# Patient Record
Sex: MALE | Race: BLACK OR AFRICAN AMERICAN | NOT HISPANIC OR LATINO | Employment: FULL TIME | URBAN - METROPOLITAN AREA
[De-identification: names, ages, dates, MRNs, and addresses within clinical notes are randomized per-mention and may not be internally consistent; named-entity substitution may affect disease eponyms.]

---

## 2017-05-09 RX ORDER — AMLODIPINE BESYLATE 5 MG/1
TABLET ORAL
Qty: 90 TABLET | Refills: 0 | OUTPATIENT
Start: 2017-05-09

## 2017-06-03 DIAGNOSIS — E78.5 HYPERLIPIDEMIA: ICD-10-CM

## 2017-06-09 ENCOUNTER — PATIENT MESSAGE (OUTPATIENT)
Dept: INTERNAL MEDICINE | Facility: CLINIC | Age: 52
End: 2017-06-09

## 2017-06-09 ENCOUNTER — TELEPHONE (OUTPATIENT)
Dept: INTERNAL MEDICINE | Facility: CLINIC | Age: 52
End: 2017-06-09

## 2017-06-09 NOTE — TELEPHONE ENCOUNTER
----- Message from Kenan Reyes MD sent at 6/9/2017  1:38 PM CDT -----  Contact: patient- [phone]  I'm not his physician and I have not established a relationship with this pt and will not fill this Rx. Thx  ----- Message -----  From: Martha Martinez MA  Sent: 6/9/2017   1:23 PM  To: Kenan Reyes MD    Should the pt follow up with current PCP? Or can you fill it? (he is scheduled to come in for a NP appt with you 6/19  ----- Message -----  From: Romy Pickard  Sent: 6/9/2017   1:11 PM  To: Eric Grayson Staff    RX request - refill or new RX.  Is this a refill or new RX:  New Rx  RX name and strength: simvastatin (ZOCOR) 20 MG tablet   Directions:   Is this a 30 day or 90 day RX:  30   Pharmacy name and phone #: [facility] [phone] (Phone)  [phone] (Fax)  Comments:  Set an appointment but is out of medication now form Dr. Millard was your PCP

## 2017-06-09 NOTE — TELEPHONE ENCOUNTER
Responded to pt request for refill on medication via patient portal. Advised pt to contact his PCP as he has not yet established with Dr Reyes.

## 2017-06-12 NOTE — TELEPHONE ENCOUNTER
----- Message from Keesha Carlson sent at 6/12/2017  1:37 PM CDT -----  Contact: Self/[phone]  Sooner appointment than the  can schedule.  When is the first available appointment: 6/19  What is the nature of the appointment: new pt physical  What visit type: NPP  Patient preference of timeframe to be scheduled:  This week  Comments: Pt would like to know if he can get a sooner apt so that he can get his medication refills. Please advise.

## 2017-06-14 ENCOUNTER — LAB VISIT (OUTPATIENT)
Dept: LAB | Facility: HOSPITAL | Age: 52
End: 2017-06-14
Attending: FAMILY MEDICINE
Payer: COMMERCIAL

## 2017-06-14 ENCOUNTER — OFFICE VISIT (OUTPATIENT)
Dept: INTERNAL MEDICINE | Facility: CLINIC | Age: 52
End: 2017-06-14
Payer: COMMERCIAL

## 2017-06-14 DIAGNOSIS — Z12.5 PROSTATE CANCER SCREENING: ICD-10-CM

## 2017-06-14 DIAGNOSIS — E66.3 OVERWEIGHT: ICD-10-CM

## 2017-06-14 DIAGNOSIS — Z12.11 COLON CANCER SCREENING: ICD-10-CM

## 2017-06-14 DIAGNOSIS — Z00.00 GENERAL MEDICAL EXAM: ICD-10-CM

## 2017-06-14 DIAGNOSIS — I10 ESSENTIAL HYPERTENSION: Primary | ICD-10-CM

## 2017-06-14 LAB
ALBUMIN SERPL BCP-MCNC: 3.9 G/DL
ALP SERPL-CCNC: 59 U/L
ALT SERPL W/O P-5'-P-CCNC: 44 U/L
ANION GAP SERPL CALC-SCNC: 9 MMOL/L
AST SERPL-CCNC: 27 U/L
BASOPHILS # BLD AUTO: 0.01 K/UL
BASOPHILS NFR BLD: 0.2 %
BILIRUB SERPL-MCNC: 0.8 MG/DL
BUN SERPL-MCNC: 14 MG/DL
CALCIUM SERPL-MCNC: 9 MG/DL
CHLORIDE SERPL-SCNC: 105 MMOL/L
CHOLEST/HDLC SERPL: 4.6 {RATIO}
CO2 SERPL-SCNC: 26 MMOL/L
COMPLEXED PSA SERPL-MCNC: 0.75 NG/ML
CREAT SERPL-MCNC: 1.1 MG/DL
DIFFERENTIAL METHOD: NORMAL
EOSINOPHIL # BLD AUTO: 0.1 K/UL
EOSINOPHIL NFR BLD: 1.7 %
ERYTHROCYTE [DISTWIDTH] IN BLOOD BY AUTOMATED COUNT: 13 %
EST. GFR  (AFRICAN AMERICAN): >60 ML/MIN/1.73 M^2
EST. GFR  (NON AFRICAN AMERICAN): >60 ML/MIN/1.73 M^2
GLUCOSE SERPL-MCNC: 86 MG/DL
HCT VFR BLD AUTO: 45 %
HDL/CHOLESTEROL RATIO: 21.7 %
HDLC SERPL-MCNC: 198 MG/DL
HDLC SERPL-MCNC: 43 MG/DL
HGB BLD-MCNC: 15.4 G/DL
LDLC SERPL CALC-MCNC: 140.2 MG/DL
LYMPHOCYTES # BLD AUTO: 1.9 K/UL
LYMPHOCYTES NFR BLD: 40.4 %
MCH RBC QN AUTO: 30.3 PG
MCHC RBC AUTO-ENTMCNC: 34.2 %
MCV RBC AUTO: 88 FL
MONOCYTES # BLD AUTO: 0.6 K/UL
MONOCYTES NFR BLD: 13.3 %
NEUTROPHILS # BLD AUTO: 2.1 K/UL
NEUTROPHILS NFR BLD: 44.2 %
NONHDLC SERPL-MCNC: 155 MG/DL
PLATELET # BLD AUTO: 203 K/UL
PMV BLD AUTO: 11.4 FL
POTASSIUM SERPL-SCNC: 4.3 MMOL/L
PROT SERPL-MCNC: 7.7 G/DL
RBC # BLD AUTO: 5.09 M/UL
SODIUM SERPL-SCNC: 140 MMOL/L
T4 FREE SERPL-MCNC: 0.93 NG/DL
TRIGL SERPL-MCNC: 74 MG/DL
TSH SERPL DL<=0.005 MIU/L-ACNC: 1.28 UIU/ML
WBC # BLD AUTO: 4.8 K/UL

## 2017-06-14 PROCEDURE — 84153 ASSAY OF PSA TOTAL: CPT

## 2017-06-14 PROCEDURE — 99999 PR PBB SHADOW E&M-EST. PATIENT-LVL III: CPT | Mod: PBBFAC,,, | Performed by: FAMILY MEDICINE

## 2017-06-14 PROCEDURE — 85025 COMPLETE CBC W/AUTO DIFF WBC: CPT

## 2017-06-14 PROCEDURE — 36415 COLL VENOUS BLD VENIPUNCTURE: CPT | Mod: PO

## 2017-06-14 PROCEDURE — 84443 ASSAY THYROID STIM HORMONE: CPT

## 2017-06-14 PROCEDURE — 84439 ASSAY OF FREE THYROXINE: CPT

## 2017-06-14 PROCEDURE — 80061 LIPID PANEL: CPT

## 2017-06-14 PROCEDURE — 99396 PREV VISIT EST AGE 40-64: CPT | Mod: S$GLB,,, | Performed by: FAMILY MEDICINE

## 2017-06-14 PROCEDURE — 80053 COMPREHEN METABOLIC PANEL: CPT

## 2017-06-14 RX ORDER — SIMVASTATIN 20 MG/1
TABLET, FILM COATED ORAL
Qty: 30 TABLET | Refills: 0 | OUTPATIENT
Start: 2017-06-14

## 2017-06-14 RX ORDER — AMLODIPINE BESYLATE 10 MG/1
10 TABLET ORAL DAILY
Qty: 30 TABLET | Refills: 5 | Status: SHIPPED | OUTPATIENT
Start: 2017-06-14 | End: 2017-07-18

## 2017-06-15 VITALS
OXYGEN SATURATION: 95 % | RESPIRATION RATE: 18 BRPM | HEIGHT: 71 IN | WEIGHT: 214.75 LBS | TEMPERATURE: 99 F | SYSTOLIC BLOOD PRESSURE: 140 MMHG | HEART RATE: 75 BPM | BODY MASS INDEX: 30.06 KG/M2 | DIASTOLIC BLOOD PRESSURE: 88 MMHG

## 2017-06-15 NOTE — PROGRESS NOTES
Subjective:   Patient ID: Kenan Villalobos is a 52 y.o. male.    Chief Complaint: Establish Care (new primary) and Annual Exam      HPI   53 yo male here to est with new pcp.  Patient queried and denies any further complaints.    PREVENTIVE MED  Diet--fair  Exercise--fair  Colorectal Ca--NA for age.  Lung ca--NA  Alcohol use--modest  Tobacco--does not use  BP--cont to monitor  Depression--none  Type 2 DM--screen  Hep C--screen  Lipids--screen  HIV--not at risk  Obesity--cont to monitor  Prostate --check psa and refer urol  STD--not at risk  TB--not at risk  Vision--check annually      ALLERGIES AND MEDICATIONS: updated and reviewed.  Review of patient's allergies indicates:  No Known Allergies    Current Outpatient Prescriptions:     amlodipine (NORVASC) 10 MG tablet, Take 1 tablet (10 mg total) by mouth once daily., Disp: 30 tablet, Rfl: 5    ketoconazole (NIZORAL) 2 % cream, Apply topically once daily., Disp: , Rfl:     triamcinolone acetonide 0.1% (KENALOG) 0.1 % cream, Apply topically 2 (two) times daily., Disp: , Rfl:     Review of Systems   Constitutional: Negative for activity change, appetite change, chills, diaphoresis, fatigue, fever and unexpected weight change.   HENT: Negative for congestion, ear discharge, ear pain, facial swelling, hearing loss, nosebleeds, postnasal drip, rhinorrhea, sinus pressure, sneezing, sore throat, tinnitus, trouble swallowing and voice change.    Eyes: Negative for photophobia, pain, discharge, redness, itching and visual disturbance.   Respiratory: Negative for cough, chest tightness, shortness of breath and wheezing.    Cardiovascular: Negative for chest pain, palpitations and leg swelling.   Gastrointestinal: Negative for abdominal distention, abdominal pain, anal bleeding, blood in stool, constipation, diarrhea, nausea, rectal pain and vomiting.   Endocrine: Negative for cold intolerance, heat intolerance, polydipsia, polyphagia and polyuria.   Genitourinary:  "Negative for difficulty urinating, dysuria and flank pain.   Musculoskeletal: Negative for arthralgias, back pain, joint swelling, myalgias and neck pain.   Skin: Negative for rash.   Neurological: Negative for dizziness, tremors, seizures, syncope, speech difficulty, weakness, light-headedness, numbness and headaches.   Psychiatric/Behavioral: Negative for behavioral problems, confusion, decreased concentration, dysphoric mood, sleep disturbance and suicidal ideas. The patient is not nervous/anxious and is not hyperactive.        Objective:     Vitals:    06/14/17 1105   BP: (!) 140/88   Pulse: 75   Resp: 18   Temp: 98.6 °F (37 °C)   TempSrc: Oral   SpO2: 95%   Weight: 97.4 kg (214 lb 11.7 oz)   Height: 5' 11" (1.803 m)   PainSc: 0-No pain     Body mass index is 29.95 kg/m².    Physical Exam   Constitutional: He is oriented to person, place, and time. He appears well-developed and well-nourished. He is cooperative. He does not have a sickly appearance. No distress.   HENT:   Head: Normocephalic and atraumatic.   Right Ear: Hearing, tympanic membrane, external ear and ear canal normal. No tenderness.   Left Ear: Hearing, tympanic membrane, external ear and ear canal normal. No tenderness.   Nose: Nose normal.   Mouth/Throat: Oropharynx is clear and moist.   Eyes: Conjunctivae and lids are normal. Pupils are equal, round, and reactive to light. Right eye exhibits no discharge. Left eye exhibits no discharge. Right conjunctiva is not injected. Left conjunctiva is not injected. No scleral icterus. Right eye exhibits normal extraocular motion. Left eye exhibits normal extraocular motion.   Neck: Normal range of motion. Neck supple. No JVD present. Carotid bruit is not present. No tracheal deviation and no edema present. No thyromegaly present.   Cardiovascular: Normal rate, regular rhythm, normal heart sounds and normal pulses.  Exam reveals no friction rub.    No murmur heard.  Pulmonary/Chest: Effort normal and breath " sounds normal. No accessory muscle usage. No respiratory distress. He has no wheezes. He has no rhonchi. He has no rales.   Abdominal: Soft. Bowel sounds are normal. He exhibits no distension, no abdominal bruit, no pulsatile midline mass and no mass. There is no hepatosplenomegaly. There is no tenderness. There is no rebound, no guarding, no CVA tenderness, no tenderness at McBurney's point and negative Hernandez's sign.   Musculoskeletal: He exhibits no edema.   Lymphadenopathy:        Head (right side): No submandibular, no preauricular and no posterior auricular adenopathy present.        Head (left side): No submandibular, no preauricular and no posterior auricular adenopathy present.     He has no cervical adenopathy.   Neurological: He is alert and oriented to person, place, and time. GCS eye subscore is 4. GCS verbal subscore is 5. GCS motor subscore is 6.   Skin: Skin is warm and dry. No ecchymosis and no rash noted. Rash is not maculopapular and not urticarial. He is not diaphoretic. No cyanosis or erythema. Nails show no clubbing.   Psychiatric: He has a normal mood and affect. His speech is normal and behavior is normal. Thought content normal. His mood appears not anxious. His affect is not angry and not inappropriate. He does not exhibit a depressed mood.       Assessment and Plan:   Kenan was seen today for establish care and annual exam.    Diagnoses and all orders for this visit:    Essential hypertension  Other orders  -     amlodipine (NORVASC) 10 MG tablet; Take 1 tablet (10 mg total) by mouth once daily.  Pt was already on amlodipine and tolerating it; increase dose and recheck in a couple weeks    Overweight    General medical exam  -     CBC auto differential; Future  -     Comprehensive metabolic panel; Future  -     Lipid panel; Future  -     TSH; Future  -     T4, free; Future    Prostate cancer screening  -     PSA, Screening; Future  -     Ambulatory Referral to Urology    Colon cancer  screening  -     Case request GI: COLONOSCOPY            Return in about 2 weeks (around 6/28/2017).    THIS NOTE WILL BE SHARED WITH THE PATIENT.

## 2017-06-22 DIAGNOSIS — Z12.11 SPECIAL SCREENING FOR MALIGNANT NEOPLASMS, COLON: Primary | ICD-10-CM

## 2017-06-22 RX ORDER — SODIUM, POTASSIUM,MAG SULFATES 17.5-3.13G
SOLUTION, RECONSTITUTED, ORAL ORAL
Qty: 1 BOTTLE | Refills: 0 | Status: SHIPPED | OUTPATIENT
Start: 2017-06-22 | End: 2017-07-18 | Stop reason: ALTCHOICE

## 2017-07-09 ENCOUNTER — PATIENT MESSAGE (OUTPATIENT)
Dept: INTERNAL MEDICINE | Facility: CLINIC | Age: 52
End: 2017-07-09

## 2017-07-11 ENCOUNTER — ANESTHESIA (OUTPATIENT)
Dept: ENDOSCOPY | Facility: HOSPITAL | Age: 52
End: 2017-07-11
Payer: COMMERCIAL

## 2017-07-11 ENCOUNTER — SURGERY (OUTPATIENT)
Age: 52
End: 2017-07-11

## 2017-07-11 ENCOUNTER — HOSPITAL ENCOUNTER (OUTPATIENT)
Facility: HOSPITAL | Age: 52
Discharge: HOME | End: 2017-07-11
Attending: COLON & RECTAL SURGERY | Admitting: COLON & RECTAL SURGERY
Payer: COMMERCIAL

## 2017-07-11 ENCOUNTER — ANESTHESIA EVENT (OUTPATIENT)
Dept: ENDOSCOPY | Facility: HOSPITAL | Age: 52
End: 2017-07-11
Payer: COMMERCIAL

## 2017-07-11 VITALS
TEMPERATURE: 97 F | RESPIRATION RATE: 20 BRPM | SYSTOLIC BLOOD PRESSURE: 116 MMHG | HEIGHT: 71 IN | HEART RATE: 77 BPM | BODY MASS INDEX: 29.4 KG/M2 | OXYGEN SATURATION: 97 % | WEIGHT: 210 LBS | DIASTOLIC BLOOD PRESSURE: 74 MMHG

## 2017-07-11 DIAGNOSIS — Z12.11 SCREENING FOR COLON CANCER: ICD-10-CM

## 2017-07-11 PROCEDURE — G0105 COLORECTAL SCRN; HI RISK IND: HCPCS | Performed by: COLON & RECTAL SURGERY

## 2017-07-11 PROCEDURE — 25000003 PHARM REV CODE 250: Performed by: NURSE PRACTITIONER

## 2017-07-11 PROCEDURE — D9220A PRA ANESTHESIA: Mod: 33,ANES,, | Performed by: ANESTHESIOLOGY

## 2017-07-11 PROCEDURE — G0105 COLORECTAL SCRN; HI RISK IND: HCPCS | Mod: ,,, | Performed by: COLON & RECTAL SURGERY

## 2017-07-11 PROCEDURE — 37000009 HC ANESTHESIA EA ADD 15 MINS: Performed by: COLON & RECTAL SURGERY

## 2017-07-11 PROCEDURE — D9220A PRA ANESTHESIA: Mod: 33,CRNA,, | Performed by: NURSE ANESTHETIST, CERTIFIED REGISTERED

## 2017-07-11 PROCEDURE — 37000008 HC ANESTHESIA 1ST 15 MINUTES: Performed by: COLON & RECTAL SURGERY

## 2017-07-11 PROCEDURE — 63600175 PHARM REV CODE 636 W HCPCS: Performed by: NURSE ANESTHETIST, CERTIFIED REGISTERED

## 2017-07-11 RX ORDER — PROPOFOL 10 MG/ML
VIAL (ML) INTRAVENOUS CONTINUOUS PRN
Status: DISCONTINUED | OUTPATIENT
Start: 2017-07-11 | End: 2017-07-11

## 2017-07-11 RX ORDER — PROPOFOL 10 MG/ML
VIAL (ML) INTRAVENOUS
Status: DISCONTINUED | OUTPATIENT
Start: 2017-07-11 | End: 2017-07-11

## 2017-07-11 RX ORDER — SODIUM CHLORIDE 9 MG/ML
INJECTION, SOLUTION INTRAVENOUS CONTINUOUS
Status: DISCONTINUED | OUTPATIENT
Start: 2017-07-11 | End: 2017-07-11 | Stop reason: HOSPADM

## 2017-07-11 RX ADMIN — SODIUM CHLORIDE: 0.9 INJECTION, SOLUTION INTRAVENOUS at 11:07

## 2017-07-11 RX ADMIN — PROPOFOL 30 MG: 10 INJECTION, EMULSION INTRAVENOUS at 11:07

## 2017-07-11 RX ADMIN — PROPOFOL 50 MG: 10 INJECTION, EMULSION INTRAVENOUS at 11:07

## 2017-07-11 RX ADMIN — PROPOFOL 150 MCG/KG/MIN: 10 INJECTION, EMULSION INTRAVENOUS at 11:07

## 2017-07-11 RX ADMIN — PROPOFOL 100 MG: 10 INJECTION, EMULSION INTRAVENOUS at 11:07

## 2017-07-11 NOTE — H&P
Colonoscopy History and Physical      Procedure : Colonoscopy    Indications:  asymptomatic screening exam    Family Hx of CRC: Father 60s    Last Colonoscopy:  none    Hx of sedation problems: none  FHX of sedation problems: none    Past Medical History:   Diagnosis Date    Hypertension        Family History   Problem Relation Age of Onset    Hypertension Mother     Glaucoma Mother     Cancer Father      colon cancer at 62    Macular degeneration Neg Hx     Retinal detachment Neg Hx     Strabismus Neg Hx     Cataracts Neg Hx     Blindness Neg Hx     Amblyopia Neg Hx        Social History     Social History    Marital status:      Spouse name: N/A    Number of children: 1    Years of education: N/A     Occupational History     [employer]     Social History Main Topics    Smoking status: Never Smoker    Smokeless tobacco: Never Used    Alcohol use No    Drug use: No    Sexual activity: Yes     Partners: Female     Other Topics Concern    Not on file     Social History Narrative    No narrative on file       Review of Systems -   Respiratory ROS: negative  Cardiovascular ROS: negative  Gastrointestinal ROS: negative  Musculoskeletal ROS: negative  Neurological ROS: negative        Physical Exam:  General: no distress  Head: normocephalic  Lungs:  normal respiratory effort  Heart: regular rate  Abdomen: soft,  Non-tender  Extremities: warm and well perfused       Deep Sedation: Mallampati Score per anesthesia    ASA: I      Patient cleared for Anesthesia:  General    Anesthesia/Surgery risks, benefits, and alternative options discussed and understood by patient/family.    Migel Anderson MD  Colon and Rectal Surgery Fellow  [phone]

## 2017-07-11 NOTE — ANESTHESIA POSTPROCEDURE EVALUATION
"Anesthesia Post Evaluation    Patient: Kenan Villalobos    Procedure(s) Performed: Procedure(s) (LRB):  COLONOSCOPY (N/A)    Final Anesthesia Type: general  Patient location during evaluation: PACU  Patient participation: Yes- Able to Participate  Level of consciousness: awake and alert and oriented  Post-procedure vital signs: reviewed and stable  Pain management: adequate  Airway patency: patent  PONV status at discharge: No PONV  Anesthetic complications: no      Cardiovascular status: stable  Respiratory status: unassisted, spontaneous ventilation and room air  Hydration status: euvolemic  Follow-up not needed.        Visit Vitals  BP (!) 103/58 (BP Location: Left arm, Patient Position: Lying, BP Method: Automatic)   Pulse 87   Temp 36.2 °C (97.2 °F)   Resp 20   Ht 5' 11" (1.803 m)   Wt 95.3 kg (210 lb)   SpO2 99%   BMI 29.29 kg/m²       Pain/Alma Delia Score: Pain Assessment Performed: Yes (7/11/2017 11:46 AM)  Presence of Pain: non-verbal indicators absent (7/11/2017 12:20 PM)  Alma Delia Score: 10 (7/11/2017 12:24 PM)      "

## 2017-07-11 NOTE — ANESTHESIA PREPROCEDURE EVALUATION
07/11/2017  Kenan Villalobos is a 52 y.o., male.    Anesthesia Evaluation         Review of Systems  Anesthesia Hx:  No problems with previous Anesthesia   Social:  Non-Smoker, Alcohol Use    Hematology/Oncology:  Hematology Normal   Oncology Normal     Cardiovascular:   Hypertension, well controlled Denies MI.    Denies Angina.    Pulmonary:  Pulmonary Normal  Denies COPD.  Denies Asthma.  Denies Shortness of breath.    Renal/:   Denies Chronic Renal Disease.     Hepatic/GI:   Denies Liver Disease.    Neurological:   Denies TIA. Denies CVA. Denies Seizures.    Endocrine:   Denies Diabetes.        Physical Exam  General:  Well nourished    Airway/Jaw/Neck:  Airway Findings: Mouth Opening: Normal Tongue: Normal  General Airway Assessment: Adult, Average  Mallampati: II  TM Distance: Normal, at least 6 cm  Jaw/Neck Findings:  Neck ROM: Normal ROM        Heart/Vascular:  Heart Findings: Rate: Normal  Rhythm: Regular Rhythm        Mental Status:  Mental Status Findings:  Cooperative, Alert and Oriented         Anesthesia Plan  Type of Anesthesia, risks & benefits discussed:  Anesthesia Type:  general  Patient's Preference:   Intra-op Monitoring Plan:   Intra-op Monitoring Plan Comments:   Post Op Pain Control Plan:   Post Op Pain Control Plan Comments: As per surgeon's plan  Induction:   IV  Beta Blocker:  Patient is not currently on a Beta-Blocker (No further documentation required).       Informed Consent: Patient understands risks and agrees with Anesthesia plan.  Questions answered. Anesthesia consent signed with patient.  ASA Score: 2     Day of Surgery Review of History & Physical:    H&P update referred to the surgeon.         Ready For Surgery From Anesthesia Perspective.

## 2017-07-11 NOTE — PATIENT INSTRUCTIONS
Discharge Summary/Instructions for after Colonoscopy without   Biopsy/Polypectomy  Patient Name: Kenan Villalobos  Patient MRN: [id number]  Patient YOB: 1965 Tuesday, July 11, 2017    Alejandro Seymour MD  RESTRICTIONS ON ACTIVITY:  - Do not drive a car or operate machinery until the day after the procedure.      - The following day: return to full activity including work.  - Diet: Eat and drink normally unless instructed otherwise.  TREATMENT FOR COMMON SIDE EFFECTS:  - Mild abdominal pain and bloating or excessive gas: walk, eat lightly, and   use a heating pad.  SYMPTOMS TO WATCH FOR AND REPORT TO YOUR PHYSICIAN:  1. Severe abdominal pain.  2. Fever greater than 101 degrees F within 24 hours after a procedure.  3. A large amount of rectal bleeding. (A small amount of blood from the   rectum is not serious, especially if hemorrhoids are present.  3.  Because air was put into your colon during the procedure, expelling   large amounts of air from your rectum is normal.  4.  You may not have a bowel movement for 1-3 days because of the   colonoscopy prep.  This is normal.  5.  Call you Doctor or go to the emergency room if you notice any of the   following:   Chills and/or fever over 101   Persistent vomiting   Severe abdominal pain, other than gas cramps   Severe chest pain   Black, tarry stools   Any bleeding - exceeding one cup  Your doctor recommends these additional instructions:  You are being discharged to home.   Your physician has recommended a repeat colonoscopy in five years for   screening purposes.  If you have any questions or problems, please call your physician.  COLON AND RECTAL SURGERY OFFICE:  [phone]  EMERGENCY PHONE NUMBER: [phone]  Alejandro Seymour MD  7/11/2017 12:08:00 PM  This report has been verified and signed electronically.

## 2017-07-11 NOTE — TRANSFER OF CARE
"Anesthesia Transfer of Care Note    Patient: Kenan Villalobos    Procedure(s) Performed: Procedure(s) (LRB):  COLONOSCOPY (N/A)    Patient location: GI    Anesthesia Type: general    Transport from OR: Transported from OR on room air with adequate spontaneous ventilation    Post pain: adequate analgesia    Post assessment: no apparent anesthetic complications    Post vital signs: stable    Level of consciousness: sedated    Nausea/Vomiting: no nausea/vomiting    Complications: none    Transfer of care protocol was followed      Last vitals:   Visit Vitals  BP (!) 144/86 (BP Location: Left arm, Patient Position: Sitting, BP Method: Automatic)   Pulse 88   Temp 37 °C (98.6 °F) (Oral)   Resp 16   Ht 5' 11" (1.803 m)   Wt 95.3 kg (210 lb)   SpO2 96%   BMI 29.29 kg/m²     "

## 2017-07-18 ENCOUNTER — OFFICE VISIT (OUTPATIENT)
Dept: INTERNAL MEDICINE | Facility: CLINIC | Age: 52
End: 2017-07-18
Payer: COMMERCIAL

## 2017-07-18 ENCOUNTER — TELEPHONE (OUTPATIENT)
Dept: ENDOSCOPY | Facility: HOSPITAL | Age: 52
End: 2017-07-18

## 2017-07-18 VITALS
WEIGHT: 219.38 LBS | BODY MASS INDEX: 30.71 KG/M2 | TEMPERATURE: 98 F | DIASTOLIC BLOOD PRESSURE: 88 MMHG | HEIGHT: 71 IN | HEART RATE: 78 BPM | SYSTOLIC BLOOD PRESSURE: 136 MMHG | RESPIRATION RATE: 16 BRPM

## 2017-07-18 DIAGNOSIS — Z78.9 MEDICATION INTOLERANCE: ICD-10-CM

## 2017-07-18 DIAGNOSIS — R60.0 LOCALIZED EDEMA: Primary | ICD-10-CM

## 2017-07-18 DIAGNOSIS — I10 ESSENTIAL HYPERTENSION: ICD-10-CM

## 2017-07-18 PROCEDURE — 99213 OFFICE O/P EST LOW 20 MIN: CPT | Mod: S$GLB,,, | Performed by: FAMILY MEDICINE

## 2017-07-18 PROCEDURE — 99999 PR PBB SHADOW E&M-EST. PATIENT-LVL III: CPT | Mod: PBBFAC,,, | Performed by: FAMILY MEDICINE

## 2017-07-18 RX ORDER — LISINOPRIL 20 MG/1
20 TABLET ORAL DAILY
Qty: 90 TABLET | Refills: 0 | Status: SHIPPED | OUTPATIENT
Start: 2017-07-18 | End: 2017-10-14 | Stop reason: SDUPTHER

## 2017-07-19 NOTE — PROGRESS NOTES
"Subjective:   Patient ID: Kenan Villalobos is a 52 y.o. male.    Chief Complaint: Edema (left ankle//; resolved after stopping BP medication ) and Hypertension (Stopped meds due to swelling in left ankle )      HPI  Cordial 53 yo male here for left ankle edema. It has resolved but resolved after stopping amlodipine 10mg daily. He was taking 5mg. He had no RIGHT ankle swelling. He had no trauma to left ankle and no leg pain and no calf edema or pain. No ho dvt. Again, it resolved a few days after stopping norvasc. No dyspnea. No other symptoms.    Patient queried and denies any further complaints.        ALLERGIES AND MEDICATIONS: updated and reviewed.  Review of patient's allergies indicates:  No Known Allergies    Current Outpatient Prescriptions:     lisinopril (PRINIVIL,ZESTRIL) 20 MG tablet, Take 1 tablet (20 mg total) by mouth once daily., Disp: 90 tablet, Rfl: 0    Review of Systems   Constitutional: Negative for activity change and unexpected weight change.   HENT: Negative for hearing loss, rhinorrhea and trouble swallowing.    Eyes: Negative for discharge and visual disturbance.   Respiratory: Negative for chest tightness and wheezing.    Cardiovascular: Negative for chest pain and palpitations.   Gastrointestinal: Negative for blood in stool, constipation, diarrhea and vomiting.   Endocrine: Negative for polydipsia and polyuria.   Genitourinary: Negative for difficulty urinating, hematuria and urgency.   Musculoskeletal: Negative for arthralgias, joint swelling and neck pain.   Neurological: Negative for weakness and headaches.   Psychiatric/Behavioral: Negative for confusion and dysphoric mood.       Objective:     Vitals:    07/18/17 1444   BP: 136/88   Pulse: 78   Resp: 16   Temp: 98 °F (36.7 °C)   TempSrc: Oral   Weight: 99.5 kg (219 lb 5.7 oz)   Height: 5' 11" (1.803 m)   PainSc:   2   PainLoc: Shoulder     Body mass index is 30.59 kg/m².    Physical Exam   Constitutional: He appears well-developed " and well-nourished.   HENT:   Head: Normocephalic and atraumatic.   Right Ear: Hearing, tympanic membrane, external ear and ear canal normal. No drainage or swelling. No decreased hearing is noted.   Left Ear: Hearing, tympanic membrane, external ear and ear canal normal. No drainage or swelling. No decreased hearing is noted.   Nose: Nose normal. No rhinorrhea.   Mouth/Throat: Oropharynx is clear and moist. No oropharyngeal exudate, posterior oropharyngeal edema or posterior oropharyngeal erythema.   Eyes: Conjunctivae, EOM and lids are normal. Pupils are equal, round, and reactive to light. Right eye exhibits no discharge and no exudate. Left eye exhibits no discharge and no exudate. Right conjunctiva is not injected. Left conjunctiva is not injected.   Neck: Trachea normal and full passive range of motion without pain. Normal carotid pulses, no hepatojugular reflux and no JVD present. Carotid bruit is not present. No neck rigidity. No edema and no erythema present. No thyroid mass and no thyromegaly present.   Cardiovascular: Normal rate, regular rhythm and normal heart sounds.    Pulmonary/Chest: Effort normal. No respiratory distress.   Abdominal: Soft. Normal appearance and bowel sounds are normal. There is no tenderness. There is negative Hernandez's sign.   Lymphadenopathy:     He has no cervical adenopathy.   Neurological: He is alert.   Skin: Skin is warm and dry.   Psychiatric: He has a normal mood and affect. His speech is normal and behavior is normal.       Assessment and Plan:   Kenan was seen today for edema and hypertension.    Diagnoses and all orders for this visit:    Localized edema    Medication intolerance    Essential hypertension    Other orders  -     lisinopril (PRINIVIL,ZESTRIL) 20 MG tablet; Take 1 tablet (20 mg total) by mouth once daily.    fu 3 months to recheck bp.    Return in about 3 months (around 10/18/2017).    THIS NOTE WILL BE SHARED WITH THE PATIENT.

## 2017-08-14 ENCOUNTER — OFFICE VISIT (OUTPATIENT)
Dept: UROLOGY | Facility: CLINIC | Age: 52
End: 2017-08-14
Payer: COMMERCIAL

## 2017-08-14 VITALS
SYSTOLIC BLOOD PRESSURE: 135 MMHG | BODY MASS INDEX: 30.25 KG/M2 | WEIGHT: 216.06 LBS | DIASTOLIC BLOOD PRESSURE: 81 MMHG | HEIGHT: 71 IN | HEART RATE: 76 BPM

## 2017-08-14 DIAGNOSIS — N40.1 BENIGN PROSTATIC HYPERPLASIA WITH URINARY FREQUENCY: Primary | ICD-10-CM

## 2017-08-14 DIAGNOSIS — R35.0 BENIGN PROSTATIC HYPERPLASIA WITH URINARY FREQUENCY: Primary | ICD-10-CM

## 2017-08-14 DIAGNOSIS — Z80.42 FAMILY HISTORY OF PROSTATE CANCER: ICD-10-CM

## 2017-08-14 DIAGNOSIS — R35.1 NOCTURIA: ICD-10-CM

## 2017-08-14 PROCEDURE — 3008F BODY MASS INDEX DOCD: CPT | Mod: S$GLB,,, | Performed by: UROLOGY

## 2017-08-14 PROCEDURE — 3079F DIAST BP 80-89 MM HG: CPT | Mod: S$GLB,,, | Performed by: UROLOGY

## 2017-08-14 PROCEDURE — 3075F SYST BP GE 130 - 139MM HG: CPT | Mod: S$GLB,,, | Performed by: UROLOGY

## 2017-08-14 PROCEDURE — 99999 PR PBB SHADOW E&M-EST. PATIENT-LVL III: CPT | Mod: PBBFAC,,, | Performed by: UROLOGY

## 2017-08-14 PROCEDURE — 99214 OFFICE O/P EST MOD 30 MIN: CPT | Mod: S$GLB,,, | Performed by: UROLOGY

## 2017-08-14 NOTE — PROGRESS NOTES
CC: prostate check    Kenan Villalobos is a 52 y.o. man who is here for the evaluation of Prostate Check  saw him initially on 8/4/14.  positive family hx of prostate cancer ( his father side uncle).  Had a problem with urinary frequency and urgency, but not bothered by it.  Day time frequency 7 to 8 x.  He is on HTN meds.  h/o hemorrhoids.  Denies flank pain, dysuira, hematuria .   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  Denies flank pain, dysuira, hematuria .      Past Medical History:   Diagnosis Date    Hypertension      Past Surgical History:   Procedure Laterality Date    COLONOSCOPY N/A 7/11/2017    Procedure: COLONOSCOPY;  Surgeon: Alejandro Seymour MD;  Location: [facility] ([address] [place] [address]);  Service: Endoscopy;  Laterality: N/A;     Social History   Substance Use Topics    Smoking status: Never Smoker    Smokeless tobacco: Never Used    Alcohol use No     Family History   Problem Relation Age of Onset    Hypertension Mother     Glaucoma Mother     Cancer Father      colon cancer at 62    Macular degeneration Neg Hx     Retinal detachment Neg Hx     Strabismus Neg Hx     Cataracts Neg Hx     Blindness Neg Hx     Amblyopia Neg Hx      Allergy:  Review of patient's allergies indicates:   Allergen Reactions    Amlodipine Edema     Ankle edema     Outpatient Encounter Prescriptions as of 8/14/2017   Medication Sig Dispense Refill    lisinopril (PRINIVIL,ZESTRIL) 20 MG tablet Take 1 tablet (20 mg total) by mouth once daily. 90 tablet 0     No facility-administered encounter medications on file as of 8/14/2017.      Review of Systems   General ROS: GENERAL:  No weight gain or loss  SKIN:  No rashes or lacerations  HEAD:  No headaches  EYES:  No exophthalmos, jaundice or blindness  EARS:  No dizziness, tinnitus or hearing loss  NOSE:  No changes in smell  MOUTH & THROAT:  No dyskinetic movements or obvious goiter  CHEST:  No shortness of breath, hyperventilation or cough  CARDIOVASCULAR:  No tachycardia or  chest pain  ABDOMEN:  No nausea, vomiting, pain, constipation or diarrhea  URINARY:  No frequency, dysuria or sexual dysfunction  ENDOCRINE:  No polydipsia, polyuria  MUSCULOSKELETAL:  No pain or stiffness of the joints  NEUROLOGIC:  No weakness, sensory changes, seizures, confusion, memory loss, tremor or other abnormal movements  Physical Exam     Vitals:    08/14/17 1048   BP: 135/81   Pulse: 76     Physical Exam   Constitutional: He is oriented to person, place, and time. He appears well-developed and well-nourished. No distress.   HENT:   Head: Normocephalic and atraumatic.   Right Ear: External ear normal.   Left Ear: External ear normal.   Nose: Nose normal.   Mouth/Throat: Oropharynx is clear and moist.   Eyes: Conjunctivae are normal. Pupils are equal, round, and reactive to light.   Neck: Normal range of motion. Neck supple. No JVD present. No tracheal deviation present. No thyromegaly present.   Cardiovascular: Normal rate, regular rhythm, normal heart sounds and intact distal pulses.  Exam reveals no gallop and no friction rub.    No murmur heard.  Pulmonary/Chest: Effort normal and breath sounds normal. No respiratory distress. He has no wheezes. He exhibits no tenderness.   Abdominal: Soft. Bowel sounds are normal. He exhibits no distension and no mass. There is no tenderness. There is no rebound and no guarding.   Genitourinary: Rectum normal and penis normal. No penile tenderness.   Genitourinary Comments: Prostate 25 grams with negative nodule or negative tenderness     Musculoskeletal: Normal range of motion. He exhibits no edema, tenderness or deformity.   Lymphadenopathy:     He has no cervical adenopathy.   Neurological: He is alert and oriented to person, place, and time.   Skin: Skin is warm and dry. He is not diaphoretic.     Psychiatric: He has a normal mood and affect. His behavior is normal. Thought content normal.     Genitalia:  Scrotum: no rash or lesion  Normal symmetric epididymis  without masses  Normal vas palpated  Normal size, symmetric testicles with no masses   Normal urethral meatus with no discharge  Normal circumcised penis with no lesion   Rectal:  Normal perineum and anus upon inspection.  Normal tone, no masses or tenderness;     LABS:  Lab Results   Component Value Date    PSA 0.75 06/14/2017    PSA 0.76 02/21/2013    PSA 0.4 06/07/2007    PSADIAG 0.62 08/04/2014     Results for orders placed or performed in visit on 08/04/14   Prostate Specific Antigen, Diagnostic   Result Value Ref Range    PSA DIAGNOSTIC 0.62 0.00 - 4.00 ng/mL     Lab Results   Component Value Date    CREATININE 1.1 06/14/2017    CREATININE 1.3 01/20/2016    CREATININE 1.1 07/22/2014     UA clear  Assessment and Plan:  Kenan was seen today for prostate check.    Diagnoses and all orders for this visit:    Benign prostatic hyperplasia with urinary frequency  -     Prostate Specific Antigen, Diagnostic; Future    Family history of prostate cancer    Nocturia    reassurance given.  PSA stable.  OK to follow up every 2 years with PSA.    Follow-up:  Return in about 2 years (around 8/14/2019) for PSA.

## 2017-08-14 NOTE — PATIENT INSTRUCTIONS
Lab Results   Component Value Date    PSA 0.75 06/14/2017    PSA 0.76 02/21/2013    PSA 0.4 06/07/2007    PSADIAG 0.62 08/04/2014

## 2017-10-16 RX ORDER — LISINOPRIL 20 MG/1
TABLET ORAL
Qty: 90 TABLET | Refills: 0 | Status: SHIPPED | OUTPATIENT
Start: 2017-10-16 | End: 2017-10-20 | Stop reason: SDUPTHER

## 2017-10-20 RX ORDER — LISINOPRIL 20 MG/1
20 TABLET ORAL DAILY
Qty: 90 TABLET | Refills: 0 | Status: CANCELLED | OUTPATIENT
Start: 2017-10-20

## 2017-10-20 RX ORDER — LISINOPRIL 20 MG/1
20 TABLET ORAL DAILY
Qty: 90 TABLET | Refills: 0 | Status: SHIPPED | OUTPATIENT
Start: 2017-10-20 | End: 2017-10-30 | Stop reason: SDUPTHER

## 2017-10-20 NOTE — TELEPHONE ENCOUNTER
Refill request incoming from wed by pt. Lisinopril  LV with dr Reyes 7-18-17  Refill already sent in today by Dr Reyes, 90 day supply  Pt needs follow up now. Was to return to care for 3 month f/u 10-18ish.

## 2017-10-23 ENCOUNTER — TELEPHONE (OUTPATIENT)
Dept: INTERNAL MEDICINE | Facility: CLINIC | Age: 52
End: 2017-10-23

## 2017-10-23 NOTE — TELEPHONE ENCOUNTER
Prescription was sent to [facility] on 10/20/17. I called in prescription at [facility] pharmacy per patient request.

## 2017-10-23 NOTE — TELEPHONE ENCOUNTER
----- Message from Rebeca Ivey sent at 10/23/2017  2:03 PM CDT -----  Contact: carlotta Rutledge has scheduled a f/u appt with you for your next available 10/30/17 and is currently out of lisinopril 20 mg.  Pt requested that you order medication as he is totally out .    Paulie Valladares [phone]

## 2017-10-30 ENCOUNTER — OFFICE VISIT (OUTPATIENT)
Dept: INTERNAL MEDICINE | Facility: CLINIC | Age: 52
End: 2017-10-30
Payer: COMMERCIAL

## 2017-10-30 DIAGNOSIS — I10 ESSENTIAL HYPERTENSION: Primary | Chronic | ICD-10-CM

## 2017-10-30 PROCEDURE — 99214 OFFICE O/P EST MOD 30 MIN: CPT | Mod: S$GLB,,, | Performed by: FAMILY MEDICINE

## 2017-10-30 PROCEDURE — 99999 PR PBB SHADOW E&M-EST. PATIENT-LVL III: CPT | Mod: PBBFAC,,, | Performed by: FAMILY MEDICINE

## 2017-10-30 RX ORDER — LISINOPRIL 30 MG/1
30 TABLET ORAL DAILY
Qty: 90 TABLET | Refills: 1 | Status: SHIPPED | OUTPATIENT
Start: 2017-10-30 | End: 2018-08-07 | Stop reason: SDUPTHER

## 2017-10-30 NOTE — PROGRESS NOTES
"Subjective:   Patient ID: Kenan Villalobos is a 52 y.o. male.    Chief Complaint: Follow-up      HPI  Mr. Villalobos here today for fu of HTN. Asymptomatic. Denies side effects with lisinopril.    Patient queried and denies any further complaints.      ALLERGIES AND MEDICATIONS: updated and reviewed.  Review of patient's allergies indicates:   Allergen Reactions    Amlodipine Edema     Ankle edema       Current Outpatient Prescriptions:     lisinopril (PRINIVIL,ZESTRIL) 30 MG tablet, Take 1 tablet (30 mg total) by mouth once daily., Disp: 90 tablet, Rfl: 1    Review of Systems   Constitutional: Negative for activity change, appetite change, chills, diaphoresis, fatigue, fever and unexpected weight change.   HENT: Negative for congestion, ear discharge, ear pain, postnasal drip, rhinorrhea, sneezing and sore throat.    Eyes: Negative for photophobia and discharge.   Respiratory: Negative for cough, chest tightness, shortness of breath and wheezing.    Cardiovascular: Negative for chest pain and palpitations.   Gastrointestinal: Negative for abdominal distention, abdominal pain, diarrhea, nausea and vomiting.   Genitourinary: Negative for dysuria.   Musculoskeletal: Negative for arthralgias and neck pain.   Skin: Negative for rash.   Neurological: Negative for headaches.       Objective:     Vitals:    10/30/17 1100   BP: (!) 143/94   Pulse: 75   Temp: 98.3 °F (36.8 °C)   TempSrc: Oral   SpO2: 98%   Weight: 97 kg (213 lb 13.5 oz)   Height: 5' 11" (1.803 m)   PainSc: 0-No pain     Body mass index is 29.83 kg/m².    Physical Exam   Constitutional: He appears well-developed and well-nourished.   HENT:   Head: Normocephalic and atraumatic.   Right Ear: Hearing, tympanic membrane, external ear and ear canal normal. No drainage or swelling. No decreased hearing is noted.   Left Ear: Hearing, tympanic membrane, external ear and ear canal normal. No drainage or swelling. No decreased hearing is noted.   Nose: Nose normal. " No rhinorrhea.   Mouth/Throat: Oropharynx is clear and moist. No oropharyngeal exudate, posterior oropharyngeal edema or posterior oropharyngeal erythema.   Eyes: Conjunctivae, EOM and lids are normal. Pupils are equal, round, and reactive to light. Right eye exhibits no discharge and no exudate. Left eye exhibits no discharge and no exudate. Right conjunctiva is not injected. Left conjunctiva is not injected.   Neck: Trachea normal and full passive range of motion without pain. Normal carotid pulses, no hepatojugular reflux and no JVD present. Carotid bruit is not present. No neck rigidity. No edema and no erythema present. No thyroid mass and no thyromegaly present.   Cardiovascular: Normal rate, regular rhythm and normal heart sounds.    Pulmonary/Chest: Effort normal. No respiratory distress.   Abdominal: Soft. Normal appearance and bowel sounds are normal. There is no tenderness. There is negative Hrenandez's sign.   Lymphadenopathy:     He has no cervical adenopathy.   Neurological: He is alert.   Skin: Skin is warm and dry.   Psychiatric: He has a normal mood and affect. His speech is normal and behavior is normal.       Assessment and Plan:   Kenan was seen today for follow-up.    Diagnoses and all orders for this visit:    Essential hypertension    Other orders  -     lisinopril (PRINIVIL,ZESTRIL) 30 MG tablet; Take 1 tablet (30 mg total) by mouth once daily.    lisinopril will be increased as above    No Follow-up on file.    THIS NOTE WILL BE SHARED WITH THE PATIENT.

## 2017-10-31 VITALS
HEART RATE: 75 BPM | DIASTOLIC BLOOD PRESSURE: 90 MMHG | BODY MASS INDEX: 29.94 KG/M2 | HEIGHT: 71 IN | TEMPERATURE: 98 F | OXYGEN SATURATION: 98 % | WEIGHT: 213.88 LBS | SYSTOLIC BLOOD PRESSURE: 134 MMHG

## 2017-10-31 PROBLEM — Z12.11 SCREENING FOR COLON CANCER: Status: RESOLVED | Noted: 2017-07-11 | Resolved: 2017-10-31

## 2018-04-24 ENCOUNTER — OFFICE VISIT (OUTPATIENT)
Dept: INTERNAL MEDICINE | Facility: CLINIC | Age: 53
End: 2018-04-24
Payer: COMMERCIAL

## 2018-04-24 VITALS
OXYGEN SATURATION: 99 % | HEART RATE: 76 BPM | TEMPERATURE: 98 F | HEIGHT: 71 IN | WEIGHT: 218.25 LBS | BODY MASS INDEX: 30.56 KG/M2 | DIASTOLIC BLOOD PRESSURE: 93 MMHG | SYSTOLIC BLOOD PRESSURE: 132 MMHG

## 2018-04-24 DIAGNOSIS — L25.9 CONTACT DERMATITIS, UNSPECIFIED CONTACT DERMATITIS TYPE, UNSPECIFIED TRIGGER: Primary | ICD-10-CM

## 2018-04-24 PROCEDURE — 99213 OFFICE O/P EST LOW 20 MIN: CPT | Mod: 25,S$GLB,, | Performed by: FAMILY MEDICINE

## 2018-04-24 PROCEDURE — 99999 PR PBB SHADOW E&M-EST. PATIENT-LVL III: CPT | Mod: PBBFAC,,, | Performed by: FAMILY MEDICINE

## 2018-04-24 PROCEDURE — 96372 THER/PROPH/DIAG INJ SC/IM: CPT | Mod: S$GLB,,, | Performed by: FAMILY MEDICINE

## 2018-04-24 PROCEDURE — 3075F SYST BP GE 130 - 139MM HG: CPT | Mod: CPTII,S$GLB,, | Performed by: FAMILY MEDICINE

## 2018-04-24 PROCEDURE — 3080F DIAST BP >= 90 MM HG: CPT | Mod: CPTII,S$GLB,, | Performed by: FAMILY MEDICINE

## 2018-04-24 RX ORDER — METHYLPREDNISOLONE 4 MG/1
TABLET ORAL
Qty: 1 PACKAGE | Refills: 0 | Status: SHIPPED | OUTPATIENT
Start: 2018-04-24 | End: 2018-05-15

## 2018-04-24 RX ORDER — TRIAMCINOLONE ACETONIDE 40 MG/ML
40 INJECTION, SUSPENSION INTRA-ARTICULAR; INTRAMUSCULAR
Status: COMPLETED | OUTPATIENT
Start: 2018-04-24 | End: 2018-04-24

## 2018-04-24 RX ADMIN — TRIAMCINOLONE ACETONIDE 40 MG: 40 INJECTION, SUSPENSION INTRA-ARTICULAR; INTRAMUSCULAR at 03:04

## 2018-04-24 NOTE — PROGRESS NOTES
"Subjective:   Patient ID: Kenan Villalobos is a 53 y.o. male.    Chief Complaint: Rash (all over the body x 2 days)      HPI  52 yo with hives on thorax and legs but not palms or soles or face. Wife started new detergent with a "bleach-free" bleach type ammonium ingredient. No buccal mucosa problems, cough, wheezes, etc.    Patient queried and denies any further complaints.        ALLERGIES AND MEDICATIONS: updated and reviewed.  Review of patient's allergies indicates:   Allergen Reactions    Amlodipine Edema     Ankle edema       Current Outpatient Prescriptions:     lisinopril (PRINIVIL,ZESTRIL) 30 MG tablet, Take 1 tablet (30 mg total) by mouth once daily., Disp: 90 tablet, Rfl: 1    methylPREDNISolone (MEDROL DOSEPACK) 4 mg tablet, use as directed, Disp: 1 Package, Rfl: 0  No current facility-administered medications for this visit.     Review of Systems   Constitutional: Negative for activity change, appetite change, chills, diaphoresis, fatigue, fever and unexpected weight change.   HENT: Negative for congestion, ear discharge, ear pain, facial swelling, hearing loss, nosebleeds, postnasal drip, rhinorrhea, sinus pressure, sneezing, sore throat, tinnitus, trouble swallowing and voice change.    Eyes: Negative for photophobia, pain, discharge, redness, itching and visual disturbance.   Respiratory: Negative for cough, chest tightness, shortness of breath and wheezing.    Cardiovascular: Negative for chest pain, palpitations and leg swelling.   Gastrointestinal: Negative for abdominal distention, abdominal pain, anal bleeding, blood in stool, constipation, diarrhea, nausea, rectal pain and vomiting.   Endocrine: Negative for cold intolerance, heat intolerance, polydipsia, polyphagia and polyuria.   Genitourinary: Negative for difficulty urinating, dysuria and flank pain.   Musculoskeletal: Negative for arthralgias, back pain, joint swelling, myalgias and neck pain.   Skin: Positive for rash. " "  Neurological: Negative for dizziness, tremors, seizures, syncope, speech difficulty, weakness, light-headedness, numbness and headaches.   Psychiatric/Behavioral: Negative for behavioral problems, confusion, decreased concentration, dysphoric mood, sleep disturbance and suicidal ideas. The patient is not nervous/anxious and is not hyperactive.        Objective:     Vitals:    04/24/18 1520   BP: (!) 132/93   Pulse: 76   Temp: 98.3 °F (36.8 °C)   TempSrc: Oral   SpO2: 99%   Weight: 99 kg (218 lb 4.1 oz)   Height: 5' 11" (1.803 m)   PainSc: 0-No pain     Body mass index is 30.44 kg/m².    Physical Exam   Constitutional: He appears well-developed and well-nourished.   HENT:   Head: Normocephalic and atraumatic.   Right Ear: Hearing, tympanic membrane, external ear and ear canal normal. No drainage or swelling. No decreased hearing is noted.   Left Ear: Hearing, tympanic membrane, external ear and ear canal normal. No drainage or swelling. No decreased hearing is noted.   Nose: Nose normal. No rhinorrhea.   Mouth/Throat: Oropharynx is clear and moist. No oropharyngeal exudate, posterior oropharyngeal edema or posterior oropharyngeal erythema.   Eyes: Conjunctivae, EOM and lids are normal. Pupils are equal, round, and reactive to light. Right eye exhibits no discharge and no exudate. Left eye exhibits no discharge and no exudate. Right conjunctiva is not injected. Left conjunctiva is not injected.   Neck: Trachea normal and full passive range of motion without pain. Normal carotid pulses, no hepatojugular reflux and no JVD present. Carotid bruit is not present. No neck rigidity. No edema and no erythema present. No thyroid mass and no thyromegaly present.   Cardiovascular: Normal rate, regular rhythm and normal heart sounds.    Pulmonary/Chest: Effort normal. No respiratory distress.   Abdominal: Soft. Normal appearance and bowel sounds are normal. There is no tenderness. There is negative Hernandez's sign. "   Lymphadenopathy:     He has no cervical adenopathy.   Neurological: He is alert.   Skin: Skin is warm and dry.        Diffuse hives not involving face, palms soles.   Psychiatric: He has a normal mood and affect. His speech is normal and behavior is normal.       Assessment and Plan:   Kenan was seen today for rash.    Diagnoses and all orders for this visit:    Contact dermatitis, unspecified contact dermatitis type, unspecified trigger    Other orders  -     triamcinolone acetonide injection 40 mg; Inject 1 mL (40 mg total) into the muscle one time.  -     methylPREDNISolone (MEDROL DOSEPACK) 4 mg tablet; use as directed    benadryl at night as needed, zyrtec daily for 10-14 days. Discussed poss topical. rtc if not better in two days. Cont lisinopril for now.     No Follow-up on file.    THIS NOTE WILL BE SHARED WITH THE PATIENT.

## 2018-08-07 DIAGNOSIS — Z00.00 ROUTINE GENERAL MEDICAL EXAMINATION AT A HEALTH CARE FACILITY: Primary | ICD-10-CM

## 2018-08-07 RX ORDER — LISINOPRIL 30 MG/1
TABLET ORAL
Qty: 90 TABLET | Refills: 0 | Status: SHIPPED | OUTPATIENT
Start: 2018-08-07 | End: 2018-08-08 | Stop reason: SDUPTHER

## 2018-08-07 NOTE — TELEPHONE ENCOUNTER
----- Message from Kenan Reyes MD sent at 8/7/2018 12:28 PM CDT -----  90 day bp med refill sent In for this very nice man but he really should do labs soon. It's been over a year. I'll do them now. He can f/u now or in Sept. Thx.

## 2018-08-07 NOTE — TELEPHONE ENCOUNTER
Spoke with pt- he said that he is coming in tomorrow for a f/u. I will set up future labs when he comes in.

## 2018-08-08 ENCOUNTER — TELEPHONE (OUTPATIENT)
Dept: PLASTIC SURGERY | Facility: CLINIC | Age: 53
End: 2018-08-08

## 2018-08-08 ENCOUNTER — OFFICE VISIT (OUTPATIENT)
Dept: INTERNAL MEDICINE | Facility: CLINIC | Age: 53
End: 2018-08-08
Payer: COMMERCIAL

## 2018-08-08 ENCOUNTER — LAB VISIT (OUTPATIENT)
Dept: LAB | Facility: HOSPITAL | Age: 53
End: 2018-08-08
Attending: FAMILY MEDICINE
Payer: COMMERCIAL

## 2018-08-08 VITALS
HEART RATE: 62 BPM | OXYGEN SATURATION: 97 % | HEIGHT: 71 IN | DIASTOLIC BLOOD PRESSURE: 83 MMHG | WEIGHT: 213.38 LBS | SYSTOLIC BLOOD PRESSURE: 123 MMHG | TEMPERATURE: 98 F | BODY MASS INDEX: 29.87 KG/M2

## 2018-08-08 DIAGNOSIS — Z80.42 FAMILY HISTORY OF PROSTATE CANCER: Chronic | ICD-10-CM

## 2018-08-08 DIAGNOSIS — L91.0 KELOID OF SKIN: ICD-10-CM

## 2018-08-08 DIAGNOSIS — K21.00 GERD WITH ESOPHAGITIS: ICD-10-CM

## 2018-08-08 DIAGNOSIS — R06.09 DOE (DYSPNEA ON EXERTION): Primary | ICD-10-CM

## 2018-08-08 DIAGNOSIS — N40.1 BENIGN PROSTATIC HYPERPLASIA WITH URINARY FREQUENCY: Chronic | ICD-10-CM

## 2018-08-08 DIAGNOSIS — R35.0 BENIGN PROSTATIC HYPERPLASIA WITH URINARY FREQUENCY: Chronic | ICD-10-CM

## 2018-08-08 DIAGNOSIS — R07.89 CHEST PRESSURE: ICD-10-CM

## 2018-08-08 DIAGNOSIS — Z00.00 ROUTINE GENERAL MEDICAL EXAMINATION AT A HEALTH CARE FACILITY: ICD-10-CM

## 2018-08-08 DIAGNOSIS — I10 ESSENTIAL HYPERTENSION: ICD-10-CM

## 2018-08-08 LAB
ALBUMIN SERPL BCP-MCNC: 4.1 G/DL
ALP SERPL-CCNC: 51 U/L
ALT SERPL W/O P-5'-P-CCNC: 38 U/L
ANION GAP SERPL CALC-SCNC: 8 MMOL/L
AST SERPL-CCNC: 24 U/L
BASOPHILS # BLD AUTO: 0.01 K/UL
BASOPHILS NFR BLD: 0.2 %
BILIRUB SERPL-MCNC: 0.8 MG/DL
BUN SERPL-MCNC: 15 MG/DL
CALCIUM SERPL-MCNC: 9.6 MG/DL
CHLORIDE SERPL-SCNC: 103 MMOL/L
CHOLEST SERPL-MCNC: 208 MG/DL
CHOLEST/HDLC SERPL: 4.6 {RATIO}
CO2 SERPL-SCNC: 27 MMOL/L
COMPLEXED PSA SERPL-MCNC: 0.73 NG/ML
CREAT SERPL-MCNC: 1.1 MG/DL
DIFFERENTIAL METHOD: NORMAL
EOSINOPHIL # BLD AUTO: 0.1 K/UL
EOSINOPHIL NFR BLD: 1.1 %
ERYTHROCYTE [DISTWIDTH] IN BLOOD BY AUTOMATED COUNT: 13.3 %
EST. GFR  (AFRICAN AMERICAN): >60 ML/MIN/1.73 M^2
EST. GFR  (NON AFRICAN AMERICAN): >60 ML/MIN/1.73 M^2
ESTIMATED AVG GLUCOSE: 105 MG/DL
GLUCOSE SERPL-MCNC: 79 MG/DL
HBA1C MFR BLD HPLC: 5.3 %
HCT VFR BLD AUTO: 44.7 %
HDLC SERPL-MCNC: 45 MG/DL
HDLC SERPL: 21.6 %
HGB BLD-MCNC: 14.8 G/DL
IMM GRANULOCYTES # BLD AUTO: 0.01 K/UL
IMM GRANULOCYTES NFR BLD AUTO: 0.2 %
LDLC SERPL CALC-MCNC: 150.4 MG/DL
LYMPHOCYTES # BLD AUTO: 2.1 K/UL
LYMPHOCYTES NFR BLD: 39.2 %
MCH RBC QN AUTO: 30.9 PG
MCHC RBC AUTO-ENTMCNC: 33.1 G/DL
MCV RBC AUTO: 93 FL
MONOCYTES # BLD AUTO: 0.6 K/UL
MONOCYTES NFR BLD: 10.6 %
NEUTROPHILS # BLD AUTO: 2.6 K/UL
NEUTROPHILS NFR BLD: 48.7 %
NONHDLC SERPL-MCNC: 163 MG/DL
NRBC BLD-RTO: 0 /100 WBC
PLATELET # BLD AUTO: 205 K/UL
PMV BLD AUTO: 11.1 FL
POTASSIUM SERPL-SCNC: 4.2 MMOL/L
PROT SERPL-MCNC: 7.9 G/DL
RBC # BLD AUTO: 4.79 M/UL
SODIUM SERPL-SCNC: 138 MMOL/L
T4 FREE SERPL-MCNC: 1.06 NG/DL
TRIGL SERPL-MCNC: 63 MG/DL
TSH SERPL DL<=0.005 MIU/L-ACNC: 1.01 UIU/ML
WBC # BLD AUTO: 5.38 K/UL

## 2018-08-08 PROCEDURE — 3008F BODY MASS INDEX DOCD: CPT | Mod: CPTII,S$GLB,, | Performed by: FAMILY MEDICINE

## 2018-08-08 PROCEDURE — 36415 COLL VENOUS BLD VENIPUNCTURE: CPT | Mod: PO

## 2018-08-08 PROCEDURE — 84439 ASSAY OF FREE THYROXINE: CPT

## 2018-08-08 PROCEDURE — 80061 LIPID PANEL: CPT

## 2018-08-08 PROCEDURE — 85025 COMPLETE CBC W/AUTO DIFF WBC: CPT

## 2018-08-08 PROCEDURE — 84153 ASSAY OF PSA TOTAL: CPT

## 2018-08-08 PROCEDURE — 3074F SYST BP LT 130 MM HG: CPT | Mod: CPTII,S$GLB,, | Performed by: FAMILY MEDICINE

## 2018-08-08 PROCEDURE — 99999 PR PBB SHADOW E&M-EST. PATIENT-LVL IV: CPT | Mod: PBBFAC,,, | Performed by: FAMILY MEDICINE

## 2018-08-08 PROCEDURE — 83036 HEMOGLOBIN GLYCOSYLATED A1C: CPT

## 2018-08-08 PROCEDURE — 84443 ASSAY THYROID STIM HORMONE: CPT

## 2018-08-08 PROCEDURE — 99214 OFFICE O/P EST MOD 30 MIN: CPT | Mod: S$GLB,,, | Performed by: FAMILY MEDICINE

## 2018-08-08 PROCEDURE — 80053 COMPREHEN METABOLIC PANEL: CPT

## 2018-08-08 PROCEDURE — 3079F DIAST BP 80-89 MM HG: CPT | Mod: CPTII,S$GLB,, | Performed by: FAMILY MEDICINE

## 2018-08-08 RX ORDER — LISINOPRIL 30 MG/1
30 TABLET ORAL DAILY
Qty: 90 TABLET | Refills: 1 | Status: SHIPPED | OUTPATIENT
Start: 2018-08-08 | End: 2019-01-07 | Stop reason: SDUPTHER

## 2018-08-09 PROBLEM — L91.0 KELOID OF SKIN: Status: ACTIVE | Noted: 2018-08-09

## 2018-08-09 PROBLEM — K21.00 GERD WITH ESOPHAGITIS: Status: ACTIVE | Noted: 2018-08-09

## 2018-08-09 PROBLEM — R06.09 DOE (DYSPNEA ON EXERTION): Status: RESOLVED | Noted: 2018-08-09 | Resolved: 2018-08-09

## 2018-08-09 PROBLEM — R06.09 DOE (DYSPNEA ON EXERTION): Status: ACTIVE | Noted: 2018-08-09

## 2018-08-09 NOTE — PROGRESS NOTES
Subjective:   Patient ID: Kenan Villalobos is a 53 y.o. male.    Chief Complaint: Follow-up      HPI  54 yo male with essential hypertension occ with gray and chest pressure. Nonsmoker. Rarely drinks etoh and doesn't abuse it.   No radiation of pain to back, arm, neck or jaw. Some diaphoresis present at times. No nausea     Patient queried and denies any further complaints.    ALLERGIES AND MEDICATIONS: updated and reviewed.  Review of patient's allergies indicates:   Allergen Reactions    Amlodipine Edema     Ankle edema       Current Outpatient Prescriptions:     lisinopril (PRINIVIL,ZESTRIL) 30 MG tablet, Take 1 tablet (30 mg total) by mouth once daily., Disp: 90 tablet, Rfl: 1    Review of Systems   Constitutional: Negative for activity change, appetite change, chills, diaphoresis, fatigue, fever and unexpected weight change.   HENT: Negative for congestion, ear discharge, ear pain, facial swelling, hearing loss, nosebleeds, postnasal drip, rhinorrhea, sinus pressure, sneezing, sore throat, tinnitus, trouble swallowing and voice change.    Eyes: Negative for photophobia, pain, discharge, redness, itching and visual disturbance.   Respiratory: Positive for chest tightness and shortness of breath. Negative for cough and wheezing.    Cardiovascular: Positive for chest pain. Negative for palpitations and leg swelling.   Gastrointestinal: Negative for abdominal distention, abdominal pain, anal bleeding, blood in stool, constipation, diarrhea, nausea, rectal pain and vomiting.   Endocrine: Negative for cold intolerance, heat intolerance, polydipsia, polyphagia and polyuria.   Genitourinary: Negative for difficulty urinating, dysuria and flank pain.   Musculoskeletal: Negative for arthralgias, back pain, joint swelling, myalgias and neck pain.   Skin: Negative for rash.   Neurological: Negative for dizziness, tremors, seizures, syncope, speech difficulty, weakness, light-headedness, numbness and headaches.  "  Psychiatric/Behavioral: Negative for behavioral problems, confusion, decreased concentration, dysphoric mood, sleep disturbance and suicidal ideas. The patient is not nervous/anxious and is not hyperactive.        Objective:     Vitals:    08/08/18 1348   BP: 123/83   Pulse: 62   Temp: 98.3 °F (36.8 °C)   TempSrc: Oral   SpO2: 97%   Weight: 96.8 kg (213 lb 6.5 oz)   Height: 5' 11" (1.803 m)   PainSc: 0-No pain     Body mass index is 29.76 kg/m².    Physical Exam   Constitutional: He is oriented to person, place, and time. He appears well-developed and well-nourished. He is cooperative. He does not have a sickly appearance. No distress.   HENT:   Head: Normocephalic and atraumatic.   Right Ear: Hearing, tympanic membrane, external ear and ear canal normal. No tenderness.   Left Ear: Hearing, tympanic membrane, external ear and ear canal normal. No tenderness.   Nose: Nose normal.   Mouth/Throat: Oropharynx is clear and moist.   Eyes: Conjunctivae and lids are normal. Pupils are equal, round, and reactive to light. Right eye exhibits no discharge. Left eye exhibits no discharge. Right conjunctiva is not injected. Left conjunctiva is not injected. No scleral icterus. Right eye exhibits normal extraocular motion. Left eye exhibits normal extraocular motion.   Neck: Normal range of motion. Neck supple. No JVD present. Carotid bruit is not present. No tracheal deviation and no edema present. No thyromegaly present.   Cardiovascular: Normal rate, regular rhythm, normal heart sounds and normal pulses.  Exam reveals no friction rub.    No murmur heard.  Pulmonary/Chest: Effort normal and breath sounds normal. No accessory muscle usage. No respiratory distress. He has no wheezes. He has no rhonchi. He has no rales.   Abdominal: Soft. Bowel sounds are normal. He exhibits no distension, no abdominal bruit, no pulsatile midline mass and no mass. There is no hepatosplenomegaly. There is no tenderness. There is no rebound, no " guarding, no CVA tenderness, no tenderness at McBurney's point and negative Hernandez's sign.   Musculoskeletal: He exhibits no edema.   Lymphadenopathy:        Head (right side): No submandibular, no preauricular and no posterior auricular adenopathy present.        Head (left side): No submandibular, no preauricular and no posterior auricular adenopathy present.     He has no cervical adenopathy.   Neurological: He is alert and oriented to person, place, and time. GCS eye subscore is 4. GCS verbal subscore is 5. GCS motor subscore is 6.   Skin: Skin is warm and dry. No ecchymosis and no rash noted. Rash is not maculopapular and not urticarial. He is not diaphoretic. No cyanosis or erythema. Nails show no clubbing.   Psychiatric: He has a normal mood and affect. His speech is normal and behavior is normal. Thought content normal. His mood appears not anxious. His affect is not angry and not inappropriate. He does not exhibit a depressed mood.   Nursing note and vitals reviewed.      Assessment and Plan:   Kenan was seen today for follow-up.    Diagnoses and all orders for this visit:    REESE (dyspnea on exertion)  -     Cancel: Exercise stress echo; Future  -     Exercise stress echo; Future    Chest pressure    Essential hypertension  -     Cancel: Exercise stress echo; Future  -     Exercise stress echo; Future    GERD with esophagitis  -     Cancel: Ambulatory Referral to Gastroenterology  -     Ambulatory Referral to Gastroenterology    [facility]  -     Ambulatory consult to Plastic Surgery    Benign prostatic hyperplasia with urinary frequency    Family history of prostate cancer    Other orders  -     lisinopril (PRINIVIL,ZESTRIL) 30 MG tablet; Take 1 tablet (30 mg total) by mouth once daily.    Time spent in the evaluation and management of this patient exceeded 45min and greater than 50% of this time was in face-to-face education regarding diagnoses, medications, plan, and follow-up.      Follow-up in  about 3 months (around 11/8/2018).    THIS NOTE WILL BE SHARED WITH THE PATIENT.

## 2019-01-07 NOTE — TELEPHONE ENCOUNTER
"----- Message from Renetta Vidal sent at 1/7/2019 12:13 PM CST -----  Contact: Pt [phone]  RX request - refill or new RX.  Is this a refill or new RX:  Refill  RX name and strength: lisinopril (PRINIVIL,ZESTRIL) 30 MG tablet  Directions:   Is this a 30 day or 90 day RX:    Local pharmacy or mail order pharmacy:  Local  Pharmacy name and phone # (DON'T enter "on file" or "in chart"): [facility] [address] [place] [address] [phone] (Phone)  [phone] (Fax)  Comments:        "

## 2019-01-08 ENCOUNTER — OFFICE VISIT (OUTPATIENT)
Dept: OPTOMETRY | Facility: CLINIC | Age: 54
End: 2019-01-08
Payer: COMMERCIAL

## 2019-01-08 ENCOUNTER — PATIENT MESSAGE (OUTPATIENT)
Dept: INTERNAL MEDICINE | Facility: CLINIC | Age: 54
End: 2019-01-08

## 2019-01-08 DIAGNOSIS — H52.203 MYOPIA OF BOTH EYES WITH ASTIGMATISM AND PRESBYOPIA: Primary | ICD-10-CM

## 2019-01-08 DIAGNOSIS — H52.13 MYOPIA OF BOTH EYES WITH ASTIGMATISM AND PRESBYOPIA: Primary | ICD-10-CM

## 2019-01-08 DIAGNOSIS — H52.4 MYOPIA OF BOTH EYES WITH ASTIGMATISM AND PRESBYOPIA: Primary | ICD-10-CM

## 2019-01-08 DIAGNOSIS — H40.013 OPEN ANGLE WITH BORDERLINE FINDINGS AND LOW GLAUCOMA RISK IN BOTH EYES: ICD-10-CM

## 2019-01-08 PROCEDURE — 92004 PR EYE EXAM, NEW PATIENT,COMPREHESV: ICD-10-PCS | Mod: S$GLB,,, | Performed by: OPTOMETRIST

## 2019-01-08 PROCEDURE — 99499 NO LOS: ICD-10-PCS | Mod: S$GLB,,, | Performed by: OPTOMETRIST

## 2019-01-08 PROCEDURE — 99999 PR PBB SHADOW E&M-EST. PATIENT-LVL II: CPT | Mod: PBBFAC,,, | Performed by: OPTOMETRIST

## 2019-01-08 PROCEDURE — 92004 COMPRE OPH EXAM NEW PT 1/>: CPT | Mod: S$GLB,,, | Performed by: OPTOMETRIST

## 2019-01-08 PROCEDURE — 99999 PR PBB SHADOW E&M-EST. PATIENT-LVL II: ICD-10-PCS | Mod: PBBFAC,,, | Performed by: OPTOMETRIST

## 2019-01-08 PROCEDURE — 99499 UNLISTED E&M SERVICE: CPT | Mod: S$GLB,,, | Performed by: OPTOMETRIST

## 2019-01-08 PROCEDURE — 92015 DETERMINE REFRACTIVE STATE: CPT | Mod: S$GLB,,, | Performed by: OPTOMETRIST

## 2019-01-08 PROCEDURE — 92015 PR REFRACTION: ICD-10-PCS | Mod: S$GLB,,, | Performed by: OPTOMETRIST

## 2019-01-08 RX ORDER — LISINOPRIL 30 MG/1
30 TABLET ORAL DAILY
Qty: 90 TABLET | Refills: 0 | Status: SHIPPED | OUTPATIENT
Start: 2019-01-08 | End: 2019-01-09

## 2019-01-08 NOTE — PROGRESS NOTES
HPI     No changes in vision since last exam. Interested in multifocal/monovision   lenses for intermittent wear. Does not use eye drops. Denies pain, redness   or irritation.   CORA: 2015 or 2016, unremarkable, current SVL distance glasses 4-5 years   old, takes glasses off for reading  Pt has HTN, diagnosed 11-12 years ago, taking lisinopril, well controlled        Last edited by Amandeep Palacios, OD on 1/8/2019  9:49 AM. (History)            Assessment /Plan     For exam results, see Encounter Report.    Myopia of both eyes with astigmatism and presbyopia    Open angle with borderline findings and low glaucoma risk in both eyes  -     OCT - Optic Nerve; Future  -     Thompson Visual Field - OU - Intermediate - Both Eyes; Future      1. Spec Rx given and  Contact lens trials dispensed to pt. Daily wear only advised, with education to risks of extended wear.  Discussed lens care, compliance and solutions.  RTC 2 weeks contact lens follow up.  Different lens options discussed with patient. RTC 1 year full exam.  2. Low risk based on Cd and race. No fam history. IOP normal, RTC for pachy, gonio, OCT of rnfl and HVF(24-2)alba std.

## 2019-01-08 NOTE — PROGRESS NOTES
Assessment /Plan     For exam results, see Encounter Report.    Myopia of both eyes with astigmatism and presbyopia      1. See contact lens exam same date.

## 2019-01-08 NOTE — MEDICAL/APP STUDENT
No changes in vision since last exam. Interested in multifocal/monovision lenses for intermittent wear. Does not use eye drops. Denies pain, redness or irritation.   CORA: 2015 or 2016, unremarkable, current SVL distance glasses 4-5 years old, takes glasses off for reading  Pt has HTN, diagnosed 11-12 years ago, taking lisinopril, well controlled

## 2019-01-09 ENCOUNTER — TELEPHONE (OUTPATIENT)
Dept: INTERNAL MEDICINE | Facility: CLINIC | Age: 54
End: 2019-01-09

## 2019-01-09 RX ORDER — LOSARTAN POTASSIUM 50 MG/1
50 TABLET ORAL DAILY
Qty: 90 TABLET | Refills: 1 | Status: SHIPPED | OUTPATIENT
Start: 2019-01-09 | End: 2019-10-17 | Stop reason: SDUPTHER

## 2019-01-09 NOTE — TELEPHONE ENCOUNTER
Spoke with pt- he is Ok with med changing. He is out of Lisinopril for 3 days now anyway.     Please advise. Thanks

## 2019-01-09 NOTE — TELEPHONE ENCOUNTER
----- Message from Kenan Reyes MD sent at 1/8/2019  4:06 PM CST -----  Please let this nice man know that   1) I need to change his lisinopril to a new med based upon a new study but don't stop this med just yet  2) I will be moving in May to a new [facility] location at [place] and [address] (He had always asked if I was leaving.)  He can come in to discuss more re the med OR I can just send in a new med and he STOP lisinopril. Doses are apples and oranges so it will be an educated guess on the right dose and he will need f/u visits to monitor the bp. Thank you. Bhavya

## 2019-01-28 ENCOUNTER — OFFICE VISIT (OUTPATIENT)
Dept: OPTOMETRY | Facility: CLINIC | Age: 54
End: 2019-01-28
Payer: COMMERCIAL

## 2019-01-28 ENCOUNTER — CLINICAL SUPPORT (OUTPATIENT)
Dept: OPHTHALMOLOGY | Facility: CLINIC | Age: 54
End: 2019-01-28
Payer: COMMERCIAL

## 2019-01-28 DIAGNOSIS — H40.013 OPEN ANGLE WITH BORDERLINE FINDINGS AND LOW GLAUCOMA RISK IN BOTH EYES: Primary | ICD-10-CM

## 2019-01-28 DIAGNOSIS — H40.013 OPEN ANGLE WITH BORDERLINE FINDINGS AND LOW GLAUCOMA RISK IN BOTH EYES: ICD-10-CM

## 2019-01-28 PROCEDURE — 92012 PR EYE EXAM, EST PATIENT,INTERMED: ICD-10-PCS | Mod: S$GLB,,, | Performed by: OPTOMETRIST

## 2019-01-28 PROCEDURE — 92082 INTERMEDIATE VISUAL FIELD XM: CPT | Mod: S$GLB,,, | Performed by: OPTOMETRIST

## 2019-01-28 PROCEDURE — 92133 CPTRZD OPH DX IMG PST SGM ON: CPT | Mod: S$GLB,,, | Performed by: OPTOMETRIST

## 2019-01-28 PROCEDURE — 99999 PR PBB SHADOW E&M-EST. PATIENT-LVL I: ICD-10-PCS | Mod: PBBFAC,,,

## 2019-01-28 PROCEDURE — 92082 HUMPHREY VISUAL FIELD-OU-INTERMEDIATE-BOTH EYES: ICD-10-PCS | Mod: S$GLB,,, | Performed by: OPTOMETRIST

## 2019-01-28 PROCEDURE — 92012 INTRM OPH EXAM EST PATIENT: CPT | Mod: S$GLB,,, | Performed by: OPTOMETRIST

## 2019-01-28 PROCEDURE — 99999 PR PBB SHADOW E&M-EST. PATIENT-LVL III: ICD-10-PCS | Mod: PBBFAC,,, | Performed by: OPTOMETRIST

## 2019-01-28 PROCEDURE — 99999 PR PBB SHADOW E&M-EST. PATIENT-LVL III: CPT | Mod: PBBFAC,,, | Performed by: OPTOMETRIST

## 2019-01-28 PROCEDURE — 76514 PR  US, EYE, FOR CORNEAL THICKNESS: ICD-10-PCS | Mod: S$GLB,,, | Performed by: OPTOMETRIST

## 2019-01-28 PROCEDURE — 92133 POSTERIOR SEGMENT OCT OPTIC NERVE(OCULAR COHERENCE TOMOGRAPHY) - OU - BOTH EYES: ICD-10-PCS | Mod: S$GLB,,, | Performed by: OPTOMETRIST

## 2019-01-28 PROCEDURE — 99999 PR PBB SHADOW E&M-EST. PATIENT-LVL I: CPT | Mod: PBBFAC,,,

## 2019-01-28 PROCEDURE — 76514 ECHO EXAM OF EYE THICKNESS: CPT | Mod: S$GLB,,, | Performed by: OPTOMETRIST

## 2019-01-28 NOTE — PROGRESS NOTES
RAKESH SHEPHERD 01/2019 Here for HVF,OCT,pachymetry, gonio and contact check today.    Patient is happy with vision and comfort of lenses.   Some trouble with   headlights at night.    Last edited by Angelina Caraballo on 1/28/2019  2:35 PM. (History)            Assessment /Plan     For exam results, see Encounter Report.    Open angle with borderline findings and low glaucoma risk in both eyes  -     Ambulatory Referral to Ophthalmology      1. Low tension suspect based on low pressure and OCT scan. Scan shows thinning of nerves OD>OS. HVF with some spots but also fixation losses. RTC for consult with Glaucoma Dr. Wilde to evaluate for low tension glaucoma and any necessary treatment. Pachy thin.

## 2019-01-30 ENCOUNTER — OFFICE VISIT (OUTPATIENT)
Dept: INTERNAL MEDICINE | Facility: CLINIC | Age: 54
End: 2019-01-30
Payer: COMMERCIAL

## 2019-01-30 DIAGNOSIS — Z80.42 FAMILY HISTORY OF PROSTATE CANCER: Chronic | ICD-10-CM

## 2019-01-30 DIAGNOSIS — I10 ESSENTIAL HYPERTENSION: Primary | Chronic | ICD-10-CM

## 2019-01-30 DIAGNOSIS — K21.00 GERD WITH ESOPHAGITIS: ICD-10-CM

## 2019-01-30 DIAGNOSIS — N40.1 BENIGN PROSTATIC HYPERPLASIA WITH URINARY FREQUENCY: Chronic | ICD-10-CM

## 2019-01-30 DIAGNOSIS — R35.1 NOCTURIA: ICD-10-CM

## 2019-01-30 DIAGNOSIS — L91.0 KELOID OF SKIN: ICD-10-CM

## 2019-01-30 DIAGNOSIS — R35.0 BENIGN PROSTATIC HYPERPLASIA WITH URINARY FREQUENCY: Chronic | ICD-10-CM

## 2019-01-30 PROCEDURE — 3008F BODY MASS INDEX DOCD: CPT | Mod: CPTII,S$GLB,, | Performed by: FAMILY MEDICINE

## 2019-01-30 PROCEDURE — 3008F PR BODY MASS INDEX (BMI) DOCUMENTED: ICD-10-PCS | Mod: CPTII,S$GLB,, | Performed by: FAMILY MEDICINE

## 2019-01-30 PROCEDURE — 99214 OFFICE O/P EST MOD 30 MIN: CPT | Mod: S$GLB,,, | Performed by: FAMILY MEDICINE

## 2019-01-30 PROCEDURE — 3078F DIAST BP <80 MM HG: CPT | Mod: CPTII,S$GLB,, | Performed by: FAMILY MEDICINE

## 2019-01-30 PROCEDURE — 99999 PR PBB SHADOW E&M-EST. PATIENT-LVL III: CPT | Mod: PBBFAC,,, | Performed by: FAMILY MEDICINE

## 2019-01-30 PROCEDURE — 3078F PR MOST RECENT DIASTOLIC BLOOD PRESSURE < 80 MM HG: ICD-10-PCS | Mod: CPTII,S$GLB,, | Performed by: FAMILY MEDICINE

## 2019-01-30 PROCEDURE — 3074F PR MOST RECENT SYSTOLIC BLOOD PRESSURE < 130 MM HG: ICD-10-PCS | Mod: CPTII,S$GLB,, | Performed by: FAMILY MEDICINE

## 2019-01-30 PROCEDURE — 3074F SYST BP LT 130 MM HG: CPT | Mod: CPTII,S$GLB,, | Performed by: FAMILY MEDICINE

## 2019-01-30 PROCEDURE — 99214 PR OFFICE/OUTPT VISIT, EST, LEVL IV, 30-39 MIN: ICD-10-PCS | Mod: S$GLB,,, | Performed by: FAMILY MEDICINE

## 2019-01-30 PROCEDURE — 99999 PR PBB SHADOW E&M-EST. PATIENT-LVL III: ICD-10-PCS | Mod: PBBFAC,,, | Performed by: FAMILY MEDICINE

## 2019-02-06 VITALS
SYSTOLIC BLOOD PRESSURE: 122 MMHG | OXYGEN SATURATION: 97 % | TEMPERATURE: 98 F | HEART RATE: 87 BPM | WEIGHT: 220 LBS | DIASTOLIC BLOOD PRESSURE: 76 MMHG | BODY MASS INDEX: 30.69 KG/M2 | RESPIRATION RATE: 15 BRPM

## 2019-02-07 NOTE — PROGRESS NOTES
Subjective:   Patient ID: Kenan Villalobos is a 54 y.o. male.    Chief Complaint: Follow-up and Hypertension      HPI  53 yo male here to fu hypertension re switch from ACE-I to ARB. Doing well overall. Understands about my move to [facility] clinic planned for May.    Patient queried and denies any further complaints.      ALLERGIES AND MEDICATIONS: updated and reviewed.  Review of patient's allergies indicates:   Allergen Reactions    Amlodipine Edema     Ankle edema       Current Outpatient Medications:     losartan (COZAAR) 50 MG tablet, Take 1 tablet (50 mg total) by mouth once daily. Pt will stop lisinopril, Disp: 90 tablet, Rfl: 1    Review of Systems   Constitutional: Negative for activity change, appetite change, chills, diaphoresis, fatigue, fever and unexpected weight change.   HENT: Negative for congestion, ear discharge, ear pain, facial swelling, hearing loss, nosebleeds, postnasal drip, rhinorrhea, sinus pressure, sneezing, sore throat, tinnitus, trouble swallowing and voice change.    Eyes: Negative for photophobia, pain, discharge, redness, itching and visual disturbance.   Respiratory: Negative for cough, chest tightness, shortness of breath and wheezing.    Cardiovascular: Negative for chest pain, palpitations and leg swelling.   Gastrointestinal: Negative for abdominal distention, abdominal pain, anal bleeding, blood in stool, constipation, diarrhea, nausea, rectal pain and vomiting.   Endocrine: Negative for cold intolerance, heat intolerance, polydipsia, polyphagia and polyuria.   Genitourinary: Negative for difficulty urinating, dysuria and flank pain.   Musculoskeletal: Negative for arthralgias, back pain, joint swelling, myalgias and neck pain.   Skin: Negative for rash.   Neurological: Negative for dizziness, tremors, seizures, syncope, speech difficulty, weakness, light-headedness, numbness and headaches.   Psychiatric/Behavioral: Negative for behavioral problems, confusion,  decreased concentration, dysphoric mood, sleep disturbance and suicidal ideas. The patient is not nervous/anxious and is not hyperactive.        Objective:     Vitals:    01/30/19 0910   BP: 122/76   Pulse: 87   Resp: 15   Temp: 98.1 °F (36.7 °C)   TempSrc: Oral   SpO2: 97%   Weight: 99.8 kg (220 lb 0.3 oz)     Body mass index is 30.69 kg/m².    Physical Exam   Constitutional: He appears well-developed and well-nourished.   HENT:   Head: Normocephalic and atraumatic.   Right Ear: Hearing, tympanic membrane, external ear and ear canal normal. No drainage or swelling. No decreased hearing is noted.   Left Ear: Hearing, tympanic membrane, external ear and ear canal normal. No drainage or swelling. No decreased hearing is noted.   Nose: Nose normal. No rhinorrhea.   Mouth/Throat: Oropharynx is clear and moist. No oropharyngeal exudate, posterior oropharyngeal edema or posterior oropharyngeal erythema.   Eyes: Conjunctivae, EOM and lids are normal. Pupils are equal, round, and reactive to light. Right eye exhibits no discharge and no exudate. Left eye exhibits no discharge and no exudate. Right conjunctiva is not injected. Left conjunctiva is not injected.   Neck: Trachea normal and full passive range of motion without pain. Normal carotid pulses, no hepatojugular reflux and no JVD present. Carotid bruit is not present. No neck rigidity. No edema and no erythema present. No thyroid mass and no thyromegaly present.   Cardiovascular: Normal rate, regular rhythm and normal heart sounds.   Pulmonary/Chest: Effort normal. No respiratory distress.   Abdominal: Soft. Normal appearance and bowel sounds are normal. There is no tenderness. There is negative Hernandez's sign.   Lymphadenopathy:     He has no cervical adenopathy.   Neurological: He is alert.   Skin: Skin is warm and dry.   Psychiatric: He has a normal mood and affect. His speech is normal and behavior is normal.   Nursing note and vitals reviewed.      Assessment and  Plan:   Kenan was seen today for follow-up and hypertension.    Diagnoses and all orders for this visit:    Essential hypertension    Benign prostatic hyperplasia with urinary frequency    Nocturia    GERD with esophagitis    Family history of prostate cancer    Keloid of skin        Follow-up in about 6 months (around 7/30/2019).    THIS NOTE WILL BE SHARED WITH THE PATIENT.

## 2019-10-17 RX ORDER — LOSARTAN POTASSIUM 50 MG/1
50 TABLET ORAL DAILY
Qty: 90 TABLET | Refills: 0 | Status: SHIPPED | OUTPATIENT
Start: 2019-10-17 | End: 2019-12-08 | Stop reason: SDUPTHER

## 2019-10-17 NOTE — TELEPHONE ENCOUNTER
----- Message from Kenan Reyes MD sent at 10/17/2019  1:57 PM CDT -----  bp med refilled. This very nice man works very close to the [facility]. I would love to still see him here at this new location, but if it is too inconvenient for him then I would see Dr. Toya Stringer or Dr. Ramakrishna Vinson in [facility]. Again, I hope he follows me here but I understand if not. He is due a six mo f/u for bp check and labs. Thank you

## 2019-12-08 ENCOUNTER — OFFICE VISIT (OUTPATIENT)
Dept: URGENT CARE | Facility: CLINIC | Age: 54
End: 2019-12-08
Payer: COMMERCIAL

## 2019-12-08 VITALS
HEART RATE: 76 BPM | RESPIRATION RATE: 18 BRPM | SYSTOLIC BLOOD PRESSURE: 150 MMHG | DIASTOLIC BLOOD PRESSURE: 95 MMHG | BODY MASS INDEX: 30.8 KG/M2 | WEIGHT: 220 LBS | OXYGEN SATURATION: 98 % | HEIGHT: 71 IN | TEMPERATURE: 98 F

## 2019-12-08 DIAGNOSIS — Z23 FLU VACCINE NEED: ICD-10-CM

## 2019-12-08 DIAGNOSIS — L02.01 ABSCESS OF FOREHEAD: Primary | ICD-10-CM

## 2019-12-08 PROCEDURE — 90471 FLU VACCINE (QUAD) GREATER THAN OR EQUAL TO 3YO PRESERVATIVE FREE IM: ICD-10-PCS | Mod: S$GLB,,, | Performed by: NURSE PRACTITIONER

## 2019-12-08 PROCEDURE — 90471 IMMUNIZATION ADMIN: CPT | Mod: S$GLB,,, | Performed by: NURSE PRACTITIONER

## 2019-12-08 PROCEDURE — 99213 PR OFFICE/OUTPT VISIT, EST, LEVL III, 20-29 MIN: ICD-10-PCS | Mod: 25,S$GLB,, | Performed by: NURSE PRACTITIONER

## 2019-12-08 PROCEDURE — 99213 OFFICE O/P EST LOW 20 MIN: CPT | Mod: 25,S$GLB,, | Performed by: NURSE PRACTITIONER

## 2019-12-08 PROCEDURE — 90686 IIV4 VACC NO PRSV 0.5 ML IM: CPT | Mod: S$GLB,,, | Performed by: NURSE PRACTITIONER

## 2019-12-08 PROCEDURE — 90686 FLU VACCINE (QUAD) GREATER THAN OR EQUAL TO 3YO PRESERVATIVE FREE IM: ICD-10-PCS | Mod: S$GLB,,, | Performed by: NURSE PRACTITIONER

## 2019-12-08 RX ORDER — LOSARTAN POTASSIUM 50 MG/1
TABLET ORAL
COMMUNITY
End: 2020-01-10 | Stop reason: SDUPTHER

## 2019-12-08 RX ORDER — DOXYCYCLINE 100 MG/1
100 CAPSULE ORAL 2 TIMES DAILY
Qty: 14 CAPSULE | Refills: 0 | Status: SHIPPED | OUTPATIENT
Start: 2019-12-08 | End: 2019-12-15

## 2019-12-08 RX ORDER — MUPIROCIN 20 MG/G
OINTMENT TOPICAL
Qty: 22 G | Refills: 0 | Status: SHIPPED | OUTPATIENT
Start: 2019-12-08 | End: 2020-01-10

## 2019-12-08 NOTE — PATIENT INSTRUCTIONS
Return to Urgent Care or go to ER if symptoms worsen or fail to improve.  Follow up with PCP/Derm within 1-2 weeks as recommended for further management.   Apply warm compresses 4xs/day    Abscess (Antibiotic Treatment Only)  An abscess (sometimes called a boil) happens when bacteria get trapped under the skin and start to grow. Pus forms inside the abscess as the body responds to the bacteria. An abscess can happen with an insect bite, ingrown hair, blocked oil gland, pimple, cyst, or puncture wound.  In the early stages, your wound may be red and tender. For this stage, you may get antibiotics. If the abscess does not get better with antibiotics, it will need to be drained with a small cut.  Home care  These tips will help you care for your abscess at home:  · Soak the wound in hot water or apply hot packs (small towel soaked in hot water) to the area for 20 minutes at a time. Do this 3 to 4 times a day.  · Do not cut, squeeze, or pop the boil yourself.  · Apply antibiotic cream or ointment to the skin 3 to 4 times a day, unless something else was prescribed. Some ointments include an antibiotic plus a pain reliever.  · If your doctor prescribed antibiotics, do not stop taking them until you have finished the medicine or the doctor tells you to stop.  · You may use an over-the-counter pain medicine to control pain, unless another pain medicine was prescribed. If you have chronic liver or kidney disease or ever had a stomach ulcer or gastrointestinal bleeding, talk with your doctor before using these any of these.  Follow-up care  Follow up with your healthcare provider, or as advised. Check your wound each day for the signs of worsening infection listed below.  When to seek medical advice  Get prompt medical attention if any of these occur:  · An increase in redness or swelling  · Red streaks in the skin leading away from the abscess  · An increase in local pain or swelling  · Fever of 100.4ºF (38ºC) or higher,  or as directed by your healthcare provider  · Pus or fluid coming from the abscess  · Boil returns after getting better  Date Last Reviewed: 9/1/2016  © 5118-2524 [employer], [employer]. [address] [place] [address], [place] [address]. All rights reserved. This information is not intended as a substitute for professional medical care. Always follow your healthcare professional's instructions.

## 2019-12-08 NOTE — PROGRESS NOTES
"Subjective:       Patient ID: Kenan Villalobos is a 54 y.o. male.    Vitals:  height is 5' 11" (1.803 m) and weight is 99.8 kg (220 lb). His tympanic temperature is 98.1 °F (36.7 °C). His blood pressure is 150/95 (abnormal) and his pulse is 76. His respiration is 18 and oxygen saturation is 98%.     Chief Complaint: Mass    Esperanza is a 54 y.o. male who presents today with a chief complaint of small pimple size bump on forehead for 3 days. His wife states it is enlarging. Area throbbing last night. He took Ibuprofen 400 mg with relief. He used a cold compress as directed on [employer]. Patient requesting a flu vaccine.    Mass   This is a new problem. The current episode started in the past 7 days. The problem occurs constantly. The problem has been gradually worsening. Pertinent negatives include no arthralgias, chest pain, chills, congestion, coughing, fatigue, fever, headaches, joint swelling, myalgias, nausea, rash, sore throat, vertigo or vomiting. Exacerbated by: palpitation. He has tried NSAIDs for the symptoms. The treatment provided mild relief.       Constitution: Negative for activity change, appetite change, chills, fatigue, fever, generalized weakness and international travel in last 60 days.   HENT: Negative for ear pain, congestion and sore throat.    Neck: Negative for painful lymph nodes.   Cardiovascular: Negative for chest pain and leg swelling.   Eyes: Negative for eye trauma, double vision and blurred vision.   Respiratory: Negative for cough and shortness of breath.    Gastrointestinal: Negative for nausea, vomiting and diarrhea.   Genitourinary: Negative for dysuria, frequency and urgency.   Musculoskeletal: Negative for joint pain, joint swelling, muscle cramps and muscle ache.   Skin: Positive for color change and abscess. Negative for pale, rash and erythema.   Allergic/Immunologic: Positive for immunizations up-to-date. Negative for seasonal allergies and flu shot.   Neurological: Negative for " dizziness, history of vertigo, light-headedness, passing out and headaches.   Hematologic/Lymphatic: Negative for swollen lymph nodes, easy bruising/bleeding and history of blood clots. Does not bruise/bleed easily.   Psychiatric/Behavioral: Negative for nervous/anxious, sleep disturbance and depression. The patient is not nervous/anxious.        Objective:      Physical Exam   Constitutional: He is oriented to person, place, and time. He appears well-developed and well-nourished.   HENT:   Head: Normocephalic and atraumatic. Head is without abrasion, without contusion and without laceration.   Right Ear: External ear normal.   Left Ear: External ear normal.   Nose: Nose normal.   Mouth/Throat: Oropharynx is clear and moist and mucous membranes are normal.   Eyes: Pupils are equal, round, and reactive to light. Conjunctivae, EOM and lids are normal.   Neck: Trachea normal, full passive range of motion without pain and phonation normal. Neck supple.   Cardiovascular: Normal rate, regular rhythm and normal heart sounds.   Pulmonary/Chest: Effort normal. No respiratory distress.   Musculoskeletal: Normal range of motion.   Neurological: He is alert and oriented to person, place, and time.   Skin: Skin is warm, dry, intact, rash (with surrounding erythema and swelling) and pustular. Capillary refill takes less than 2 seconds. abrasion, burn, bruising, erythema and ecchymosis  Psychiatric: He has a normal mood and affect. His speech is normal and behavior is normal. Judgment and thought content normal. Cognition and memory are normal.   Nursing note and vitals reviewed.        Assessment:       1. Abscess of forehead    2. Flu vaccine need        Plan:         Abscess of forehead  -     doxycycline (VIBRAMYCIN) 100 MG Cap; Take 1 capsule (100 mg total) by mouth 2 (two) times daily. for 7 days  Dispense: 14 capsule; Refill: 0    Flu vaccine need  -     Influenza - Quadrivalent (6 months+) (PF)    Other orders  -      mupirocin (BACTROBAN) 2 % ointment; Apply to affected area 3 times daily  Dispense: 22 g; Refill: 0       Expressed purulent material from abscess-- cleaned-- applied mupirocin and covered.

## 2020-01-10 ENCOUNTER — OFFICE VISIT (OUTPATIENT)
Dept: PRIMARY CARE CLINIC | Facility: CLINIC | Age: 55
End: 2020-01-10
Payer: COMMERCIAL

## 2020-01-10 VITALS
TEMPERATURE: 98 F | HEIGHT: 70 IN | HEART RATE: 85 BPM | BODY MASS INDEX: 31.53 KG/M2 | OXYGEN SATURATION: 97 % | DIASTOLIC BLOOD PRESSURE: 86 MMHG | SYSTOLIC BLOOD PRESSURE: 138 MMHG | WEIGHT: 220.25 LBS

## 2020-01-10 DIAGNOSIS — Z00.00 ROUTINE MEDICAL EXAM: Primary | ICD-10-CM

## 2020-01-10 DIAGNOSIS — N40.1 BENIGN PROSTATIC HYPERPLASIA WITH URINARY FREQUENCY: Chronic | ICD-10-CM

## 2020-01-10 DIAGNOSIS — Z80.42 FAMILY HISTORY OF PROSTATE CANCER: Chronic | ICD-10-CM

## 2020-01-10 DIAGNOSIS — I10 ESSENTIAL HYPERTENSION: ICD-10-CM

## 2020-01-10 DIAGNOSIS — R35.0 BENIGN PROSTATIC HYPERPLASIA WITH URINARY FREQUENCY: Chronic | ICD-10-CM

## 2020-01-10 DIAGNOSIS — L91.0 KELOID OF SKIN: ICD-10-CM

## 2020-01-10 PROCEDURE — 99396 PR PREVENTIVE VISIT,EST,40-64: ICD-10-PCS | Mod: S$GLB,,, | Performed by: FAMILY MEDICINE

## 2020-01-10 PROCEDURE — 99999 PR PBB SHADOW E&M-EST. PATIENT-LVL III: CPT | Mod: PBBFAC,,, | Performed by: FAMILY MEDICINE

## 2020-01-10 PROCEDURE — 3075F PR MOST RECENT SYSTOLIC BLOOD PRESS GE 130-139MM HG: ICD-10-PCS | Mod: CPTII,S$GLB,, | Performed by: FAMILY MEDICINE

## 2020-01-10 PROCEDURE — 3075F SYST BP GE 130 - 139MM HG: CPT | Mod: CPTII,S$GLB,, | Performed by: FAMILY MEDICINE

## 2020-01-10 PROCEDURE — 99396 PREV VISIT EST AGE 40-64: CPT | Mod: S$GLB,,, | Performed by: FAMILY MEDICINE

## 2020-01-10 PROCEDURE — 3079F DIAST BP 80-89 MM HG: CPT | Mod: CPTII,S$GLB,, | Performed by: FAMILY MEDICINE

## 2020-01-10 PROCEDURE — 3079F PR MOST RECENT DIASTOLIC BLOOD PRESSURE 80-89 MM HG: ICD-10-PCS | Mod: CPTII,S$GLB,, | Performed by: FAMILY MEDICINE

## 2020-01-10 PROCEDURE — 99999 PR PBB SHADOW E&M-EST. PATIENT-LVL III: ICD-10-PCS | Mod: PBBFAC,,, | Performed by: FAMILY MEDICINE

## 2020-01-10 RX ORDER — LOSARTAN POTASSIUM 50 MG/1
TABLET ORAL
Qty: 90 TABLET | Refills: 3 | Status: SHIPPED | OUTPATIENT
Start: 2020-01-10 | End: 2020-01-10 | Stop reason: SDUPTHER

## 2020-01-10 RX ORDER — LOSARTAN POTASSIUM 100 MG/1
TABLET ORAL
Qty: 90 TABLET | Refills: 3 | Status: SHIPPED | OUTPATIENT
Start: 2020-01-10 | End: 2021-03-22 | Stop reason: SDUPTHER

## 2020-01-11 PROBLEM — R35.1 NOCTURIA: Status: RESOLVED | Noted: 2017-08-14 | Resolved: 2020-01-11

## 2020-01-11 PROBLEM — K21.00 GERD WITH ESOPHAGITIS: Status: RESOLVED | Noted: 2018-08-09 | Resolved: 2020-01-11

## 2020-01-11 NOTE — PROGRESS NOTES
Subjective:   Patient ID: Kenan Villalobos is a 54 y.o. male.    Chief Complaint: Annual Exam      HPI  53 yo male with essential hypertension here for annual exam.    Patient queried and denies any further complaints.    PREVENTIVE MED REVIEWED WITH PATIENT    ALLERGIES AND MEDICATIONS: updated and reviewed.  Review of patient's allergies indicates:   Allergen Reactions    Amlodipine Edema     Ankle edema       Current Outpatient Medications:     losartan (COZAAR) 100 MG tablet, One po daily 100mg is the correct dose, Disp: 90 tablet, Rfl: 3    Review of Systems   Constitutional: Negative for activity change, appetite change, chills, diaphoresis, fatigue, fever and unexpected weight change.   HENT: Negative for congestion, ear discharge, ear pain, facial swelling, hearing loss, nosebleeds, postnasal drip, rhinorrhea, sinus pressure, sneezing, sore throat, tinnitus, trouble swallowing and voice change.    Eyes: Negative for photophobia, pain, discharge, redness, itching and visual disturbance.   Respiratory: Negative for cough, chest tightness, shortness of breath and wheezing.    Cardiovascular: Negative for chest pain, palpitations and leg swelling.   Gastrointestinal: Negative for abdominal distention, abdominal pain, anal bleeding, blood in stool, constipation, diarrhea, nausea, rectal pain and vomiting.   Endocrine: Negative for cold intolerance, heat intolerance, polydipsia, polyphagia and polyuria.   Genitourinary: Negative for difficulty urinating, dysuria, flank pain, hematuria and urgency.   Musculoskeletal: Negative for arthralgias, back pain, joint swelling, myalgias and neck pain.   Skin: Negative for rash.   Neurological: Negative for dizziness, tremors, seizures, syncope, speech difficulty, weakness, light-headedness, numbness and headaches.   Psychiatric/Behavioral: Negative for behavioral problems, confusion, decreased concentration, dysphoric mood, sleep disturbance and suicidal ideas. The  "patient is not nervous/anxious and is not hyperactive.        Objective:     Vitals:    01/10/20 0959 01/10/20 1015   BP: (!) 160/90 138/86   Pulse: 85    Temp: 98.4 °F (36.9 °C)    TempSrc: Oral    SpO2: 97%    Weight: 99.9 kg (220 lb 3.8 oz)    Height: 5' 10" (1.778 m)    PainSc: 0-No pain      Body mass index is 31.6 kg/m².    Physical Exam   Constitutional: He is oriented to person, place, and time. He appears well-developed and well-nourished. He is cooperative. He does not have a sickly appearance. No distress.   HENT:   Head: Normocephalic and atraumatic.   Right Ear: Hearing, tympanic membrane, external ear and ear canal normal. No tenderness.   Left Ear: Hearing, tympanic membrane, external ear and ear canal normal. No tenderness.   Nose: Nose normal.   Mouth/Throat: Oropharynx is clear and moist.   Eyes: Pupils are equal, round, and reactive to light. Conjunctivae and lids are normal. Right eye exhibits no discharge. Left eye exhibits no discharge. Right conjunctiva is not injected. Left conjunctiva is not injected. No scleral icterus. Right eye exhibits normal extraocular motion. Left eye exhibits normal extraocular motion.   Neck: Normal range of motion. Neck supple. No JVD present. Carotid bruit is not present. No tracheal deviation and no edema present. No thyromegaly present.   Cardiovascular: Normal rate, regular rhythm, normal heart sounds and normal pulses. Exam reveals no friction rub.   No murmur heard.  Pulmonary/Chest: Effort normal and breath sounds normal. No accessory muscle usage. No respiratory distress. He has no wheezes. He has no rhonchi. He has no rales.   Abdominal: Soft. Bowel sounds are normal. He exhibits no distension, no abdominal bruit, no pulsatile midline mass and no mass. There is no hepatosplenomegaly. There is no tenderness. There is no rebound, no guarding, no CVA tenderness, no tenderness at McBurney's point and negative Hernandez's sign.   Musculoskeletal: He exhibits no " edema.   Lymphadenopathy:        Head (right side): No submandibular, no preauricular and no posterior auricular adenopathy present.        Head (left side): No submandibular, no preauricular and no posterior auricular adenopathy present.     He has no cervical adenopathy.   Neurological: He is alert and oriented to person, place, and time. GCS eye subscore is 4. GCS verbal subscore is 5. GCS motor subscore is 6.   Skin: Skin is warm and dry. No ecchymosis and no rash noted. Rash is not maculopapular and not urticarial. He is not diaphoretic. No cyanosis or erythema. Nails show no clubbing.   Psychiatric: He has a normal mood and affect. His speech is normal and behavior is normal. Thought content normal. His mood appears not anxious. His affect is not angry and not inappropriate. He does not exhibit a depressed mood.   Nursing note and vitals reviewed.      Assessment and Plan:   Kenan was seen today for annual exam.    Diagnoses and all orders for this visit:    Routine medical exam  -     Comprehensive metabolic panel; Future  -     CBC auto differential; Future  -     Hemoglobin A1c; Future  -     Lipid panel; Future  -     TSH; Future  -     PSA, Screening; Future    Essential hypertension    BMI 31.0-31.9,adult    Keloid of skin    Benign prostatic hyperplasia with urinary frequency    Family history of prostate cancer    Other orders  -     Discontinue: losartan (COZAAR) 50 MG tablet; losartan 50 mg tablet  -     losartan (COZAAR) 100 MG tablet; One po daily 100mg is the correct dose      He will see outside derm for keloid on chest    Follow up in about 6 months (around 7/10/2020).    THIS NOTE WILL BE SHARED WITH THE PATIENT.

## 2020-01-14 ENCOUNTER — LAB VISIT (OUTPATIENT)
Dept: LAB | Facility: HOSPITAL | Age: 55
End: 2020-01-14
Attending: FAMILY MEDICINE
Payer: COMMERCIAL

## 2020-01-14 DIAGNOSIS — Z00.00 ROUTINE MEDICAL EXAM: ICD-10-CM

## 2020-01-14 LAB
ALBUMIN SERPL BCP-MCNC: 3.8 G/DL (ref 3.5–5.2)
ALP SERPL-CCNC: 58 U/L (ref 55–135)
ALT SERPL W/O P-5'-P-CCNC: 47 U/L (ref 10–44)
ANION GAP SERPL CALC-SCNC: 7 MMOL/L (ref 8–16)
AST SERPL-CCNC: 28 U/L (ref 10–40)
BASOPHILS # BLD AUTO: 0.02 K/UL (ref 0–0.2)
BASOPHILS NFR BLD: 0.4 % (ref 0–1.9)
BILIRUB SERPL-MCNC: 0.5 MG/DL (ref 0.1–1)
BUN SERPL-MCNC: 11 MG/DL (ref 6–20)
CALCIUM SERPL-MCNC: 9.3 MG/DL (ref 8.7–10.5)
CHLORIDE SERPL-SCNC: 105 MMOL/L (ref 95–110)
CHOLEST SERPL-MCNC: 221 MG/DL (ref 120–199)
CHOLEST/HDLC SERPL: 4.9 {RATIO} (ref 2–5)
CO2 SERPL-SCNC: 29 MMOL/L (ref 23–29)
COMPLEXED PSA SERPL-MCNC: 0.73 NG/ML (ref 0–4)
CREAT SERPL-MCNC: 1.1 MG/DL (ref 0.5–1.4)
DIFFERENTIAL METHOD: NORMAL
EOSINOPHIL # BLD AUTO: 0.1 K/UL (ref 0–0.5)
EOSINOPHIL NFR BLD: 1.4 % (ref 0–8)
ERYTHROCYTE [DISTWIDTH] IN BLOOD BY AUTOMATED COUNT: 12.7 % (ref 11.5–14.5)
EST. GFR  (AFRICAN AMERICAN): >60 ML/MIN/1.73 M^2
EST. GFR  (NON AFRICAN AMERICAN): >60 ML/MIN/1.73 M^2
ESTIMATED AVG GLUCOSE: 120 MG/DL (ref 68–131)
GLUCOSE SERPL-MCNC: 93 MG/DL (ref 70–110)
HBA1C MFR BLD HPLC: 5.8 % (ref 4–5.6)
HCT VFR BLD AUTO: 46.8 % (ref 40–54)
HDLC SERPL-MCNC: 45 MG/DL (ref 40–75)
HDLC SERPL: 20.4 % (ref 20–50)
HGB BLD-MCNC: 15.7 G/DL (ref 14–18)
IMM GRANULOCYTES # BLD AUTO: 0.01 K/UL (ref 0–0.04)
IMM GRANULOCYTES NFR BLD AUTO: 0.2 % (ref 0–0.5)
LDLC SERPL CALC-MCNC: 161.4 MG/DL (ref 63–159)
LYMPHOCYTES # BLD AUTO: 1.9 K/UL (ref 1–4.8)
LYMPHOCYTES NFR BLD: 40 % (ref 18–48)
MCH RBC QN AUTO: 30.1 PG (ref 27–31)
MCHC RBC AUTO-ENTMCNC: 33.5 G/DL (ref 32–36)
MCV RBC AUTO: 90 FL (ref 82–98)
MONOCYTES # BLD AUTO: 0.7 K/UL (ref 0.3–1)
MONOCYTES NFR BLD: 14 % (ref 4–15)
NEUTROPHILS # BLD AUTO: 2.1 K/UL (ref 1.8–7.7)
NEUTROPHILS NFR BLD: 44 % (ref 38–73)
NONHDLC SERPL-MCNC: 176 MG/DL
NRBC BLD-RTO: 0 /100 WBC
PLATELET # BLD AUTO: 214 K/UL (ref 150–350)
PMV BLD AUTO: 11.1 FL (ref 9.2–12.9)
POTASSIUM SERPL-SCNC: 4.2 MMOL/L (ref 3.5–5.1)
PROT SERPL-MCNC: 7.5 G/DL (ref 6–8.4)
RBC # BLD AUTO: 5.21 M/UL (ref 4.6–6.2)
SODIUM SERPL-SCNC: 141 MMOL/L (ref 136–145)
TRIGL SERPL-MCNC: 73 MG/DL (ref 30–150)
TSH SERPL DL<=0.005 MIU/L-ACNC: 1.19 UIU/ML (ref 0.4–4)
WBC # BLD AUTO: 4.85 K/UL (ref 3.9–12.7)

## 2020-01-14 PROCEDURE — 84153 ASSAY OF PSA TOTAL: CPT

## 2020-01-14 PROCEDURE — 83036 HEMOGLOBIN GLYCOSYLATED A1C: CPT

## 2020-01-14 PROCEDURE — 80061 LIPID PANEL: CPT

## 2020-01-14 PROCEDURE — 80053 COMPREHEN METABOLIC PANEL: CPT

## 2020-01-14 PROCEDURE — 84443 ASSAY THYROID STIM HORMONE: CPT

## 2020-01-14 PROCEDURE — 85025 COMPLETE CBC W/AUTO DIFF WBC: CPT

## 2020-01-14 PROCEDURE — 36415 COLL VENOUS BLD VENIPUNCTURE: CPT | Mod: PO

## 2020-03-18 ENCOUNTER — PATIENT MESSAGE (OUTPATIENT)
Dept: ADMINISTRATIVE | Facility: OTHER | Age: 55
End: 2020-03-18

## 2020-03-19 ENCOUNTER — TELEPHONE (OUTPATIENT)
Dept: PRIMARY CARE CLINIC | Facility: CLINIC | Age: 55
End: 2020-03-19

## 2020-03-19 ENCOUNTER — PATIENT MESSAGE (OUTPATIENT)
Dept: PRIMARY CARE CLINIC | Facility: CLINIC | Age: 55
End: 2020-03-19

## 2020-03-19 RX ORDER — ROSUVASTATIN CALCIUM 5 MG/1
5 TABLET, COATED ORAL DAILY
Qty: 90 TABLET | Refills: 1 | Status: SHIPPED | OUTPATIENT
Start: 2020-03-19 | End: 2020-12-24 | Stop reason: SDUPTHER

## 2020-03-19 NOTE — TELEPHONE ENCOUNTER
----- Message from Hoda Salcedo sent at 3/19/2020 11:55 AM CDT -----  Contact: self   Pt is calling about starting his Rx for cholesterol medication. Pt states his b/p has stabilized. Please call and advise.

## 2020-03-19 NOTE — TELEPHONE ENCOUNTER
Patient was told in January to consider cholesterol medication, patient states he would like to be prescribed cholesterol medication now.

## 2020-03-22 ENCOUNTER — PATIENT MESSAGE (OUTPATIENT)
Dept: PRIMARY CARE CLINIC | Facility: CLINIC | Age: 55
End: 2020-03-22

## 2020-03-23 ENCOUNTER — OFFICE VISIT (OUTPATIENT)
Dept: PRIMARY CARE CLINIC | Facility: CLINIC | Age: 55
End: 2020-03-23
Payer: COMMERCIAL

## 2020-03-23 ENCOUNTER — PATIENT MESSAGE (OUTPATIENT)
Dept: PRIMARY CARE CLINIC | Facility: CLINIC | Age: 55
End: 2020-03-23

## 2020-03-23 DIAGNOSIS — I10 ESSENTIAL HYPERTENSION: Chronic | ICD-10-CM

## 2020-03-23 DIAGNOSIS — K52.9 AGE (ACUTE GASTROENTERITIS): Primary | ICD-10-CM

## 2020-03-23 DIAGNOSIS — R00.0 SINUS TACHYCARDIA: ICD-10-CM

## 2020-03-23 PROCEDURE — 99214 PR OFFICE/OUTPT VISIT, EST, LEVL IV, 30-39 MIN: ICD-10-PCS | Mod: 95,,, | Performed by: FAMILY MEDICINE

## 2020-03-23 PROCEDURE — 99214 OFFICE O/P EST MOD 30 MIN: CPT | Mod: 95,,, | Performed by: FAMILY MEDICINE

## 2020-03-23 RX ORDER — CARVEDILOL 3.12 MG/1
3.12 TABLET ORAL 2 TIMES DAILY WITH MEALS
Qty: 60 TABLET | Refills: 2 | Status: SHIPPED | OUTPATIENT
Start: 2020-03-23 | End: 2020-08-24 | Stop reason: SDUPTHER

## 2020-03-23 NOTE — PROGRESS NOTES
Subjective:   Patient ID: Kenan Villalobos is a 55 y.o. male.    Chief Complaint: No chief complaint on file.      HPI  The patient location is: home  The chief complaint leading to consultation is: high blood pressure  Visit type: Virtual visit with synchronous audio and video  Total time spent with patient:  20 min  Each patient to whom he or she provides medical services by telemedicine is:  (1) informed of the relationship between the physician and patient and the respective role of any other health care provider with respect to management of the patient; and (2) notified that he or she may decline to receive medical services by telemedicine and may withdraw from such care at any time.    Notes:  As below    Patient queried and denies any further complaints.    Patient with some recent mild upper respiratory symptoms.  Was concerned about corona virus things going on, took some over-the-counter cold medications.  He has had some upset stomach associated with some frontal headaches.  No visual changes.  No focal neurological changes.  Not the worst headache of his life.  No emesis.  No anorexia.  Has had some soft bowel movements but no lizette diarrhea.  Ate lunch today.  Decent to good appetite  Some of the symptoms seem to be coinciding after eating some takeout chicken also.  Has had episodes where his heart seems to be racing at times.  This is at rest.  No history of atrial fibrillation.  Patient was often on some cold medications.  Unknown if 1 of those had phenylephrine in it.  It does not seem that 1 of them had pseudoephedrine.    He has had no fevers.  He has had some cage in or chills.  He has no known contact with positive persons with corona virus    Has had some high systolic values.  This is on a home monitor associated with [facility]    ALLERGIES AND MEDICATIONS: updated and reviewed.  Review of patient's allergies indicates:   Allergen Reactions    Amlodipine Edema     Ankle edema       Current  Outpatient Medications:     carvediloL (COREG) 3.125 MG tablet, Take 1 tablet (3.125 mg total) by mouth 2 (two) times daily with meals., Disp: 60 tablet, Rfl: 2    losartan (COZAAR) 100 MG tablet, One po daily 100mg is the correct dose, Disp: 90 tablet, Rfl: 3    rosuvastatin (CRESTOR) 5 MG tablet, Take 1 tablet (5 mg total) by mouth once daily., Disp: 90 tablet, Rfl: 1    Review of Systems   Constitutional: Positive for fatigue. Negative for activity change, appetite change, chills, diaphoresis, fever and unexpected weight change.   HENT: Negative for congestion, ear discharge, ear pain, postnasal drip, rhinorrhea, sneezing and sore throat.    Eyes: Negative for photophobia and discharge.   Respiratory: Negative for cough, chest tightness, shortness of breath and wheezing.    Cardiovascular: Negative for chest pain and palpitations.   Gastrointestinal: Positive for abdominal distention, abdominal pain and nausea. Negative for diarrhea ( soft bowel movements but no diarrhea) and vomiting.   Genitourinary: Negative for dysuria.   Musculoskeletal: Negative for arthralgias and neck pain.   Skin: Negative for rash.   Neurological: Negative for headaches.       Objective:   There were no vitals filed for this visit.  There is no height or weight on file to calculate BMI.    Physical Exam   Constitutional: He is oriented to person, place, and time. No distress (Does not appear to be in distress).   Neurological: He is alert and oriented to person, place, and time.   Psychiatric: He has a normal mood and affect. His behavior is normal.       Assessment and Plan:   Diagnoses and all orders for this visit:    AGE (acute gastroenteritis)    Sinus tachycardia    Essential hypertension    Other orders  -     carvediloL (COREG) 3.125 MG tablet; Take 1 tablet (3.125 mg total) by mouth 2 (two) times daily with meals.     Hydrate, rest, Tylenol as needed for headache.  If he develops more postnasal drip or mucus then Mucinex  expectorant or Allegra.  Stop his other cold medications which he has already done    Continue to hydrate well.  Carvedilol 3.125 mg p.o. b.i.d. to be taken along with his losartan 100 mg p.o. daily.    We are trying to avoid potential exposure to this nice man during this time of corona virus outbreak.  If needed, we will have him set up with us a nurse visit to check his blood pressure.  He understands we are trying to avoid him to come here for his safety and for the safety of others.    He will let us know how he is doing    Reviewed old labs.  Medications for blood pressure as above.  Low-sodium Mediterranean diet    Continue Crestor for lipid    Follow up in about 3 days (around 3/26/2020) for lack of improvement.    THIS NOTE WILL BE SHARED WITH THE PATIENT.

## 2020-03-25 ENCOUNTER — PATIENT MESSAGE (OUTPATIENT)
Dept: PRIMARY CARE CLINIC | Facility: CLINIC | Age: 55
End: 2020-03-25

## 2020-03-26 ENCOUNTER — TELEPHONE (OUTPATIENT)
Dept: PRIMARY CARE CLINIC | Facility: CLINIC | Age: 55
End: 2020-03-26

## 2020-03-26 ENCOUNTER — PATIENT MESSAGE (OUTPATIENT)
Dept: PRIMARY CARE CLINIC | Facility: CLINIC | Age: 55
End: 2020-03-26

## 2020-03-26 NOTE — TELEPHONE ENCOUNTER
----- Message from Rebeca Ivey sent at 3/26/2020  7:32 AM CDT -----  Contact: patient [phone]  Patient called to ask for your advice. He has had a temp of 100-102 off and on x 2 weeks. Pt said that at night he would sometime have chills / cough slightly productive/      [id number] - [place] - [address] AT [facility] [address] [phone]

## 2020-03-26 NOTE — TELEPHONE ENCOUNTER
Patient states he is experiencing fever of 102 this morning, and dry non-productive cough. Patient would like to have order to be tested for COVID-19.

## 2020-08-19 ENCOUNTER — LAB VISIT (OUTPATIENT)
Dept: LAB | Facility: HOSPITAL | Age: 55
End: 2020-08-19
Attending: FAMILY MEDICINE
Payer: COMMERCIAL

## 2020-08-19 ENCOUNTER — PATIENT OUTREACH (OUTPATIENT)
Dept: ADMINISTRATIVE | Facility: HOSPITAL | Age: 55
End: 2020-08-19

## 2020-08-19 ENCOUNTER — OFFICE VISIT (OUTPATIENT)
Dept: PRIMARY CARE CLINIC | Facility: CLINIC | Age: 55
End: 2020-08-19
Payer: COMMERCIAL

## 2020-08-19 VITALS
WEIGHT: 213.88 LBS | TEMPERATURE: 98 F | HEIGHT: 70 IN | DIASTOLIC BLOOD PRESSURE: 94 MMHG | BODY MASS INDEX: 30.62 KG/M2 | OXYGEN SATURATION: 97 % | HEART RATE: 77 BPM | SYSTOLIC BLOOD PRESSURE: 144 MMHG

## 2020-08-19 DIAGNOSIS — R00.2 PALPITATION: ICD-10-CM

## 2020-08-19 DIAGNOSIS — B35.9 DERMATOPHYTOSIS: ICD-10-CM

## 2020-08-19 DIAGNOSIS — Z01.84 ENCOUNTER FOR ANTIBODY RESPONSE EXAMINATION: ICD-10-CM

## 2020-08-19 DIAGNOSIS — M79.10 MYALGIA: ICD-10-CM

## 2020-08-19 DIAGNOSIS — R07.9 CHEST PAIN, UNSPECIFIED TYPE: Primary | ICD-10-CM

## 2020-08-19 DIAGNOSIS — R06.09 DOE (DYSPNEA ON EXERTION): ICD-10-CM

## 2020-08-19 LAB
BASOPHILS # BLD AUTO: 0.02 K/UL (ref 0–0.2)
BASOPHILS NFR BLD: 0.4 % (ref 0–1.9)
DIFFERENTIAL METHOD: NORMAL
EOSINOPHIL # BLD AUTO: 0.1 K/UL (ref 0–0.5)
EOSINOPHIL NFR BLD: 2.3 % (ref 0–8)
ERYTHROCYTE [DISTWIDTH] IN BLOOD BY AUTOMATED COUNT: 13.3 % (ref 11.5–14.5)
HCT VFR BLD AUTO: 45.3 % (ref 40–54)
HGB BLD-MCNC: 15.4 G/DL (ref 14–18)
IMM GRANULOCYTES # BLD AUTO: 0.01 K/UL (ref 0–0.04)
IMM GRANULOCYTES NFR BLD AUTO: 0.2 % (ref 0–0.5)
LYMPHOCYTES # BLD AUTO: 2.1 K/UL (ref 1–4.8)
LYMPHOCYTES NFR BLD: 40.5 % (ref 18–48)
MCH RBC QN AUTO: 30.4 PG (ref 27–31)
MCHC RBC AUTO-ENTMCNC: 34 G/DL (ref 32–36)
MCV RBC AUTO: 90 FL (ref 82–98)
MONOCYTES # BLD AUTO: 0.7 K/UL (ref 0.3–1)
MONOCYTES NFR BLD: 13.2 % (ref 4–15)
NEUTROPHILS # BLD AUTO: 2.2 K/UL (ref 1.8–7.7)
NEUTROPHILS NFR BLD: 43.4 % (ref 38–73)
NRBC BLD-RTO: 0 /100 WBC
PLATELET # BLD AUTO: 219 K/UL (ref 150–350)
PMV BLD AUTO: 11.4 FL (ref 9.2–12.9)
RBC # BLD AUTO: 5.06 M/UL (ref 4.6–6.2)
SARS-COV-2 IGG SERPLBLD QL IA.RAPID: POSITIVE
WBC # BLD AUTO: 5.16 K/UL (ref 3.9–12.7)

## 2020-08-19 PROCEDURE — 36415 COLL VENOUS BLD VENIPUNCTURE: CPT | Mod: PN

## 2020-08-19 PROCEDURE — 3080F PR MOST RECENT DIASTOLIC BLOOD PRESSURE >= 90 MM HG: ICD-10-PCS | Mod: CPTII,S$GLB,, | Performed by: FAMILY MEDICINE

## 2020-08-19 PROCEDURE — 99214 OFFICE O/P EST MOD 30 MIN: CPT | Mod: S$GLB,,, | Performed by: FAMILY MEDICINE

## 2020-08-19 PROCEDURE — 3008F PR BODY MASS INDEX (BMI) DOCUMENTED: ICD-10-PCS | Mod: CPTII,S$GLB,, | Performed by: FAMILY MEDICINE

## 2020-08-19 PROCEDURE — 83036 HEMOGLOBIN GLYCOSYLATED A1C: CPT

## 2020-08-19 PROCEDURE — 99999 PR PBB SHADOW E&M-EST. PATIENT-LVL III: CPT | Mod: PBBFAC,,, | Performed by: FAMILY MEDICINE

## 2020-08-19 PROCEDURE — 99999 PR PBB SHADOW E&M-EST. PATIENT-LVL III: ICD-10-PCS | Mod: PBBFAC,,, | Performed by: FAMILY MEDICINE

## 2020-08-19 PROCEDURE — 86769 SARS-COV-2 COVID-19 ANTIBODY: CPT

## 2020-08-19 PROCEDURE — 80053 COMPREHEN METABOLIC PANEL: CPT

## 2020-08-19 PROCEDURE — 84443 ASSAY THYROID STIM HORMONE: CPT

## 2020-08-19 PROCEDURE — 3077F PR MOST RECENT SYSTOLIC BLOOD PRESSURE >= 140 MM HG: ICD-10-PCS | Mod: CPTII,S$GLB,, | Performed by: FAMILY MEDICINE

## 2020-08-19 PROCEDURE — 84439 ASSAY OF FREE THYROXINE: CPT

## 2020-08-19 PROCEDURE — 3008F BODY MASS INDEX DOCD: CPT | Mod: CPTII,S$GLB,, | Performed by: FAMILY MEDICINE

## 2020-08-19 PROCEDURE — 3077F SYST BP >= 140 MM HG: CPT | Mod: CPTII,S$GLB,, | Performed by: FAMILY MEDICINE

## 2020-08-19 PROCEDURE — 99214 PR OFFICE/OUTPT VISIT, EST, LEVL IV, 30-39 MIN: ICD-10-PCS | Mod: S$GLB,,, | Performed by: FAMILY MEDICINE

## 2020-08-19 PROCEDURE — 3080F DIAST BP >= 90 MM HG: CPT | Mod: CPTII,S$GLB,, | Performed by: FAMILY MEDICINE

## 2020-08-19 PROCEDURE — 85025 COMPLETE CBC W/AUTO DIFF WBC: CPT

## 2020-08-19 RX ORDER — METHOCARBAMOL 500 MG/1
TABLET, FILM COATED ORAL
Qty: 40 TABLET | Refills: 0 | Status: SHIPPED | OUTPATIENT
Start: 2020-08-19 | End: 2020-12-24

## 2020-08-19 RX ORDER — KETOCONAZOLE 20 MG/ML
SHAMPOO, SUSPENSION TOPICAL
Qty: 120 ML | Refills: 5 | Status: SHIPPED | OUTPATIENT
Start: 2020-08-19 | End: 2020-12-24

## 2020-08-19 RX ORDER — KETOCONAZOLE 20 MG/G
CREAM TOPICAL DAILY
Qty: 30 G | Refills: 5 | Status: SHIPPED | OUTPATIENT
Start: 2020-08-19 | End: 2020-12-24

## 2020-08-19 RX ORDER — NABUMETONE 750 MG/1
750 TABLET, FILM COATED ORAL 2 TIMES DAILY
Qty: 20 TABLET | Refills: 0 | Status: SHIPPED | OUTPATIENT
Start: 2020-08-19 | End: 2020-12-24

## 2020-08-19 NOTE — PROGRESS NOTES
Pre-visit chart review completed.  Immunizations reviewed and updated.    Patient due for the following      

## 2020-08-19 NOTE — PROGRESS NOTES
Subjective:   Patient ID: Kenan Villalobos is a 55 y.o. male.    Chief Complaint: Back Pain, Cramping (LEGS), and Palpitations      Palpitations   This is a new problem. The current episode started more than 1 month ago. The problem occurs intermittently. The problem has been gradually worsening. Nothing aggravates the symptoms. Associated symptoms include anxiety, chest fullness, chest pain, diaphoresis, dizziness, an irregular heartbeat, malaise/fatigue and weakness. Pertinent negatives include no coughing, fever, nausea, numbness, shortness of breath, syncope or vomiting. He has tried nothing for the symptoms. The treatment provided no relief. Risk factors include being male, stress, sedentary lifestyle, family history and obesity. There is no history of anemia, anxiety, drug use or heart disease.     Low back pain bilaterally mostly in muscles it seems and not in si joint. Also with bilat buttocks and thighs. Taking ibuprofen 2 tablets prn with brief improvement    Tough moving about in bed bc     Palpitations that may run for far more than a few seconds    No first degree relative w cad  Non-smoker  Rare to no etoh  Minimal to no caffeine  Normally sleeps well    STARTED STATIN relatively recently      Also w rash/itch    Patient queried and denies any further complaints.    LOCATION  DURATION  SEVERITY  QUALITY  TIMING  CAUSE  ASSOCIATED SYMPTOMS  MODIFIERS.    ALLERGIES AND MEDICATIONS: updated and reviewed.  Review of patient's allergies indicates:   Allergen Reactions    Amlodipine Edema     Ankle edema       Current Outpatient Medications:     losartan (COZAAR) 100 MG tablet, One po daily 100mg is the correct dose, Disp: 90 tablet, Rfl: 3    rosuvastatin (CRESTOR) 5 MG tablet, Take 1 tablet (5 mg total) by mouth once daily., Disp: 90 tablet, Rfl: 1    carvediloL (COREG) 3.125 MG tablet, Take 1 tablet (3.125 mg total) by mouth 2 (two) times daily with meals., Disp: 180 tablet, Rfl: 1    ketoconazole  (NIZORAL) 2 % cream, Apply topically once daily. At night time, Disp: 30 g, Rfl: 5    ketoconazole (NIZORAL) 2 % shampoo, Shampoo daily for 14 days; leave it on for 5 min; after 14 days,  2x week prn, Disp: 120 mL, Rfl: 5    methocarbamoL (ROBAXIN) 500 MG Tab, One po tid for 3-10 day with meals, Disp: 40 tablet, Rfl: 0    nabumetone (RELAFEN) 750 MG tablet, Take 1 tablet (750 mg total) by mouth 2 (two) times daily. For 3-10 days for musculoskeletal pain, Disp: 20 tablet, Rfl: 0    Review of Systems   Constitutional: Positive for diaphoresis and malaise/fatigue. Negative for activity change, appetite change, chills, fatigue, fever and unexpected weight change.   HENT: Negative for congestion, ear discharge, ear pain, facial swelling, hearing loss, nosebleeds, postnasal drip, rhinorrhea, sinus pressure, sneezing, sore throat, tinnitus, trouble swallowing and voice change.    Eyes: Negative for photophobia, pain, discharge, redness, itching and visual disturbance.   Respiratory: Negative for cough, chest tightness, shortness of breath and wheezing.    Cardiovascular: Positive for chest pain. Negative for palpitations, leg swelling and syncope.   Gastrointestinal: Negative for abdominal distention, abdominal pain, anal bleeding, blood in stool, constipation, diarrhea, nausea, rectal pain and vomiting.   Endocrine: Negative for cold intolerance, heat intolerance, polydipsia, polyphagia and polyuria.   Genitourinary: Negative for difficulty urinating, dysuria and flank pain.   Musculoskeletal: Positive for back pain and myalgias. Negative for arthralgias, gait problem, joint swelling and neck stiffness.   Skin: Negative for rash.   Neurological: Positive for dizziness and weakness. Negative for tremors, seizures, syncope, speech difficulty, light-headedness, numbness and headaches.   Psychiatric/Behavioral: Negative for behavioral problems, confusion, decreased concentration, dysphoric mood, sleep disturbance and  "suicidal ideas. The patient is nervous/anxious. The patient is not hyperactive.        Objective:     Vitals:    08/19/20 1338 08/19/20 1405   BP: (!) 144/94 (!) 144/94   Pulse: 77    Temp: 98.3 °F (36.8 °C)    TempSrc: Oral    SpO2: 97%    Weight: 97 kg (213 lb 13.5 oz)    Height: 5' 10" (1.778 m)    PainSc:   4    PainLoc: Back      Body mass index is 30.68 kg/m².    Physical Exam  Vitals signs and nursing note reviewed.   Constitutional:       Appearance: Normal appearance. He is well-developed.   HENT:      Head: Normocephalic and atraumatic.      Right Ear: Hearing, tympanic membrane, ear canal and external ear normal. No decreased hearing noted. No drainage or swelling.      Left Ear: Hearing, tympanic membrane, ear canal and external ear normal. No decreased hearing noted. No drainage or swelling.      Nose: Nose normal. No rhinorrhea.      Mouth/Throat:      Pharynx: No oropharyngeal exudate or posterior oropharyngeal erythema.   Eyes:      General: Lids are normal.         Right eye: No discharge.         Left eye: No discharge.      Conjunctiva/sclera: Conjunctivae normal.      Right eye: Right conjunctiva is not injected. No exudate.     Left eye: Left conjunctiva is not injected. No exudate.     Pupils: Pupils are equal, round, and reactive to light.   Neck:      Musculoskeletal: Full passive range of motion without pain. No edema, erythema or neck rigidity.      Thyroid: No thyroid mass or thyromegaly.      Vascular: Normal carotid pulses. No carotid bruit, hepatojugular reflux or JVD.      Trachea: Trachea normal.   Cardiovascular:      Rate and Rhythm: Normal rate and regular rhythm.      Heart sounds: Normal heart sounds.   Pulmonary:      Effort: Pulmonary effort is normal. No respiratory distress.   Abdominal:      General: Bowel sounds are normal.      Palpations: Abdomen is soft.      Tenderness: There is no abdominal tenderness. Negative signs include Hernandez's sign.   Lymphadenopathy:      " Cervical: No cervical adenopathy.   Skin:     General: Skin is warm and dry.   Neurological:      Mental Status: He is alert.   Psychiatric:         Speech: Speech normal.         Behavior: Behavior normal.         Assessment and Plan:   Kenan was seen today for back pain, cramping and palpitations.    Diagnoses and all orders for this visit:    Chest pain, unspecified type  -     Holter monitor - 24 hour; Future  -     Stress Echo Which stress agent will be used? Treadmill Exercise; Color Flow Doppler? No; Future  -     Holter monitor - 24 hour; Future    Palpitation  -     Holter monitor - 24 hour; Future  -     Holter monitor - 24 hour; Future  -     CBC auto differential; Future  -     Comprehensive metabolic panel; Future  -     Hemoglobin A1C; Future  -     TSH; Future  -     T4, free; Future    REESE (dyspnea on exertion)  -     Stress Echo Which stress agent will be used? Treadmill Exercise; Color Flow Doppler? No; Future  -     Holter monitor - 24 hour; Future    Encounter for antibody response examination  -     COVID-19 (SARS CoV-2) IgG Antibody; Future    Myalgia    Dermatophytosis    Other orders  -     nabumetone (RELAFEN) 750 MG tablet; Take 1 tablet (750 mg total) by mouth 2 (two) times daily. For 3-10 days for musculoskeletal pain  -     methocarbamoL (ROBAXIN) 500 MG Tab; One po tid for 3-10 day with meals  -     ketoconazole (NIZORAL) 2 % cream; Apply topically once daily. At night time  -     ketoconazole (NIZORAL) 2 % shampoo; Shampoo daily for 14 days; leave it on for 5 min; after 14 days, us 2x week prn    hold statin two weeks. Hydrate well. See if myalgias stop. Then may try again but already on very low dose crestor at 5mg    Studies as above    Time spent in the evaluation and management of this patient exceeded 45min and greater than 50% of this time was in face-to-face education regarding diagnoses, medications, plan, and follow-up.    No follow-ups on file.    THIS NOTE WILL BE SHARED  WITH THE PATIENT.

## 2020-08-20 LAB
ALBUMIN SERPL BCP-MCNC: 4 G/DL (ref 3.5–5.2)
ALP SERPL-CCNC: 50 U/L (ref 55–135)
ALT SERPL W/O P-5'-P-CCNC: 27 U/L (ref 10–44)
ANION GAP SERPL CALC-SCNC: 9 MMOL/L (ref 8–16)
AST SERPL-CCNC: 21 U/L (ref 10–40)
BILIRUB SERPL-MCNC: 0.5 MG/DL (ref 0.1–1)
BUN SERPL-MCNC: 16 MG/DL (ref 6–20)
CALCIUM SERPL-MCNC: 9.7 MG/DL (ref 8.7–10.5)
CHLORIDE SERPL-SCNC: 107 MMOL/L (ref 95–110)
CO2 SERPL-SCNC: 25 MMOL/L (ref 23–29)
CREAT SERPL-MCNC: 1.1 MG/DL (ref 0.5–1.4)
EST. GFR  (AFRICAN AMERICAN): >60 ML/MIN/1.73 M^2
EST. GFR  (NON AFRICAN AMERICAN): >60 ML/MIN/1.73 M^2
ESTIMATED AVG GLUCOSE: 117 MG/DL (ref 68–131)
GLUCOSE SERPL-MCNC: 78 MG/DL (ref 70–110)
HBA1C MFR BLD HPLC: 5.7 % (ref 4–5.6)
POTASSIUM SERPL-SCNC: 4.2 MMOL/L (ref 3.5–5.1)
PROT SERPL-MCNC: 7.7 G/DL (ref 6–8.4)
SODIUM SERPL-SCNC: 141 MMOL/L (ref 136–145)
T4 FREE SERPL-MCNC: 0.88 NG/DL (ref 0.71–1.51)
TSH SERPL DL<=0.005 MIU/L-ACNC: 0.76 UIU/ML (ref 0.4–4)

## 2020-09-24 ENCOUNTER — TELEPHONE (OUTPATIENT)
Dept: PRIMARY CARE CLINIC | Facility: CLINIC | Age: 55
End: 2020-09-24

## 2020-09-24 NOTE — TELEPHONE ENCOUNTER
Spoke with patient.  He states he doesn't check his blood pressure at home.    He states he's actively looking for a different PCP.

## 2020-09-25 ENCOUNTER — PATIENT MESSAGE (OUTPATIENT)
Dept: OTHER | Facility: OTHER | Age: 55
End: 2020-09-25

## 2020-10-17 NOTE — PROGRESS NOTES
Subjective:       Patient ID: Kenan Villalobos is a 55 y.o. male.    Chief Complaint:   Establish Care      HPI    #Last visit/Previous Provider  This patient is new to me  Previously seen by No primary care provider on file. Dylan Reyes MD  Last visit was 8/26/20   Last CPE was 1/11/20         #Interim Hx    No urgent care or ED/hospitalization    #Concerns Today    Rash  Patient noticed a rash for some time.  He reports that he saw Dr. [facility] for this.  Reports that he thinks if he was told was a he rash.  Rash is flat not raise.  Does not drain.  Every once in a while he will get itching around the area.  He has not had previously had a rash.  No new other exposures.      Palpitation  Patient reports intermittent palpitations symptoms.  He reports usually gets the of the evening.  When he lays down to go to bed.  He has not had chest pain or shortness of breath with this.  He has never lost consciousness.  This occurs most nights of the week.        #Chronic Problems      HTN  Dx; age 37  Complication: no CVA/CKD/CAD  Last labs : 8/19/2020  Negative stress 2013   Medication: adherent to   - carvediolol 3.125 g bid ( causes cough - history of astham as a child   - losartan 100 mg daily  Home Bps; has bP cuff at home  Symptoms: denies CP, SOB, changes in urination, sudden weakness, numbness    FHX prostate cancer- multiple uncles    HLD- thinks the atrovastatin cuase cough    predm  Lab Results   Component Value Date    HGBA1C 5.7 (H) 08/19/2020           Health Maintenance Due   Topic Date Due    Hepatitis C Screening  1965    Shingles Vaccine (2 of 2) 03/06/2020    Influenza Vaccine (1) 08/01/2020       Health Maintenance Topics with due status: Not Due       Topic Last Completion Date    TETANUS VACCINE 01/22/2016    Colorectal Cancer Screening 07/11/2017    Lipid Panel 01/14/2020         Review of Systems   Constitutional: Negative for activity change, appetite change, fatigue and fever.  "  Respiratory: Negative for shortness of breath.    Cardiovascular: Positive for palpitations. Negative for chest pain.   Gastrointestinal: Negative for abdominal pain.   Genitourinary: Negative for difficulty urinating.   Skin: Positive for color change.   Neurological: Negative for syncope and headaches.       Objective:   BP (!) 140/90 (BP Location: Right arm, Patient Position: Sitting, BP Method: Large (Manual))   Pulse 80   Temp 97.3 °F (36.3 °C) (Temporal)   Ht 5' 10" (1.778 m)   Wt 101.9 kg (224 lb 10.4 oz)   SpO2 98%   BMI 32.23 kg/m²          Physical Exam  Vitals signs and nursing note reviewed.   Constitutional:       General: He is not in acute distress.     Appearance: He is well-developed. He is not diaphoretic.   HENT:      Head: Normocephalic.      Mouth/Throat:      Pharynx: No oropharyngeal exudate.   Eyes:      General:         Right eye: No discharge.         Left eye: No discharge.      Conjunctiva/sclera: Conjunctivae normal.      Pupils: Pupils are equal, round, and reactive to light.   Cardiovascular:      Rate and Rhythm: Normal rate and regular rhythm.      Pulses:           Dorsalis pedis pulses are 2+ on the right side and 2+ on the left side.        Posterior tibial pulses are 2+ on the right side and 2+ on the left side.      Heart sounds: Normal heart sounds. Heart sounds not distant. No murmur. No friction rub. No gallop. No S3 or S4 sounds.    Pulmonary:      Effort: Pulmonary effort is normal. No respiratory distress.   Abdominal:      General: There is no distension.      Palpations: Abdomen is soft.   Musculoskeletal:      Right lower leg: No edema.      Left lower leg: No edema.   Skin:     General: Skin is warm.          Neurological:      Mental Status: He is alert.             ASCVD  The 10-year ASCVD risk score (Gonzales AFUA Jr., et al., 2013) is: 12.5%    Values used to calculate the score:      Age: 55 years      Sex: Male      Is Non- : Yes      " Diabetic: No      Tobacco smoker: No      Systolic Blood Pressure: 140 mmHg      Is BP treated: Yes      HDL Cholesterol: 45 mg/dL      Total Cholesterol: 221 mg/dL    Basic Labs  BMP  Lab Results   Component Value Date     08/19/2020    K 4.2 08/19/2020     08/19/2020    CO2 25 08/19/2020    BUN 16 08/19/2020    CREATININE 1.1 08/19/2020    CALCIUM 9.7 08/19/2020    ANIONGAP 9 08/19/2020    ESTGFRAFRICA >60.0 08/19/2020    EGFRNONAA >60.0 08/19/2020     Lab Results   Component Value Date    ALT 27 08/19/2020    AST 21 08/19/2020    ALKPHOS 50 (L) 08/19/2020    BILITOT 0.5 08/19/2020       Lab Results   Component Value Date    TSH 0.764 08/19/2020           STD  Lab Results   Component Value Date    GEI38BRSD Negative 01/22/2016     Lab Results   Component Value Date    RPR Non-reactive 01/22/2016     No results found for: HAV, HEPAIGM, HEPBIGM, HEPBCAB, HBEAG, HEPCAB      Lipids  Lab Results   Component Value Date    CHOL 221 (H) 01/14/2020     Lab Results   Component Value Date    HDL 45 01/14/2020     Lab Results   Component Value Date    LDLCALC 161.4 (H) 01/14/2020     Lab Results   Component Value Date    TRIG 73 01/14/2020     Lab Results   Component Value Date    CHOLHDL 20.4 01/14/2020       DM  Lab Results   Component Value Date    HGBA1C 5.7 (H) 08/19/2020       PSA  PSA, SCREEN (ng/mL)   Date Value   01/14/2020 0.73       Assessment:       1. Palpitation    2. Encounter for hepatitis C screening test for low risk patient    3. Prediabetes    4. Need for vaccination    5. Screening for hyperlipidemia    6. Screening for malignant neoplasm of prostate    7. Advance care planning    8. Essential hypertension            Plan:             Kenan was seen today for establish care.    Diagnoses and all orders for this visit:    Essential hypertension   New;uncontrolled  -- Patient is NOT at goal today  -- Labs are reviewed and wnl  -- antihypertensive regimen    - Need f/u w/u of palpitation to  make decision on carvdiolol however appreciate that it might be cuasing cough given childhood asthma   - Would like HCTZ in the future given AAM demographics  --  return for BP follow up 4 weeks for follow up     Palpitation  New;uncontrolled  Unclear etiology , would r/o cardiac arrthymia first  Pt had to leave prior to EKG being preformed  Will return of EKG  Set up for holter and stress ekg  -     Magnesium; Future  -     EKG 12-lead  -     Holter Monitor - 3-14 Day Adult; Future  -     Exercise Stress - EKG; Future    Encounter for hepatitis C screening test for low risk patient  -     Hepatitis C Antibody; Future    Prediabetes  Chronic; stable  Trend labs  -     Hemoglobin A1C; Future    Need for vaccination  -- I reviewed the patient vaccine history and provided the risk benefits and side effects of the vaccine.   -- I provided the patient a VIS sheet on the vaccine.   -     Influenza - Quadrivalent (PF)    Screening for hyperlipidemia  -     Lipid Panel; Future    Screening for malignant neoplasm of prostate  -     Cancel: PSA, Screening; Future    Advance care planning  I initiated the process of advance care planning today  including the concept of advance directives to the patient as well as designating a Health Care Power of  (HCPOA). The Patient was also instructed to communicate with this person about their wishes for future healthcare, should he become sick and lose decision-making capacity. The patient has not previously appointed a HCPOA.  . I provided forms for the patient to complete and return regarding Advance Directive and HCPOA.       No future appointments.        Medication List with Changes/Refills   Current Medications    CARVEDILOL (COREG) 3.125 MG TABLET    Take 1 tablet (3.125 mg total) by mouth 2 (two) times daily with meals.    KETOCONAZOLE (NIZORAL) 2 % CREAM    Apply topically once daily. At night time    KETOCONAZOLE (NIZORAL) 2 % SHAMPOO    Shampoo daily for 14 days; leave  it on for 5 min; after 14 days, us 2x week prn    LOSARTAN (COZAAR) 100 MG TABLET    One po daily 100mg is the correct dose    METHOCARBAMOL (ROBAXIN) 500 MG TAB    One po tid for 3-10 day with meals    NABUMETONE (RELAFEN) 750 MG TABLET    Take 1 tablet (750 mg total) by mouth 2 (two) times daily. For 3-10 days for musculoskeletal pain    ROSUVASTATIN (CRESTOR) 5 MG TABLET    Take 1 tablet (5 mg total) by mouth once daily.         Disclaimer:  This note has been generated using voice-recognition software. There may be grammatical or spelling errors that have been missed during proof-reading

## 2020-10-20 ENCOUNTER — OFFICE VISIT (OUTPATIENT)
Dept: INTERNAL MEDICINE | Facility: CLINIC | Age: 55
End: 2020-10-20
Payer: COMMERCIAL

## 2020-10-20 VITALS
DIASTOLIC BLOOD PRESSURE: 90 MMHG | HEIGHT: 70 IN | WEIGHT: 224.63 LBS | TEMPERATURE: 97 F | SYSTOLIC BLOOD PRESSURE: 140 MMHG | HEART RATE: 80 BPM | BODY MASS INDEX: 32.16 KG/M2 | OXYGEN SATURATION: 98 %

## 2020-10-20 DIAGNOSIS — R00.2 PALPITATION: Primary | ICD-10-CM

## 2020-10-20 DIAGNOSIS — Z71.89 ADVANCE CARE PLANNING: ICD-10-CM

## 2020-10-20 DIAGNOSIS — Z23 NEED FOR VACCINATION: ICD-10-CM

## 2020-10-20 DIAGNOSIS — Z13.220 SCREENING FOR HYPERLIPIDEMIA: ICD-10-CM

## 2020-10-20 DIAGNOSIS — I10 ESSENTIAL HYPERTENSION: ICD-10-CM

## 2020-10-20 DIAGNOSIS — R73.03 PREDIABETES: ICD-10-CM

## 2020-10-20 DIAGNOSIS — Z12.5 SCREENING FOR MALIGNANT NEOPLASM OF PROSTATE: ICD-10-CM

## 2020-10-20 DIAGNOSIS — Z11.59 ENCOUNTER FOR HEPATITIS C SCREENING TEST FOR LOW RISK PATIENT: ICD-10-CM

## 2020-10-20 PROCEDURE — 99214 OFFICE O/P EST MOD 30 MIN: CPT | Mod: 25,S$GLB,, | Performed by: INTERNAL MEDICINE

## 2020-10-20 PROCEDURE — 90471 FLU VACCINE (QUAD) GREATER THAN OR EQUAL TO 3YO PRESERVATIVE FREE IM: ICD-10-PCS | Mod: S$GLB,,, | Performed by: INTERNAL MEDICINE

## 2020-10-20 PROCEDURE — 3077F PR MOST RECENT SYSTOLIC BLOOD PRESSURE >= 140 MM HG: ICD-10-PCS | Mod: CPTII,S$GLB,, | Performed by: INTERNAL MEDICINE

## 2020-10-20 PROCEDURE — 3080F PR MOST RECENT DIASTOLIC BLOOD PRESSURE >= 90 MM HG: ICD-10-PCS | Mod: CPTII,S$GLB,, | Performed by: INTERNAL MEDICINE

## 2020-10-20 PROCEDURE — 99214 PR OFFICE/OUTPT VISIT, EST, LEVL IV, 30-39 MIN: ICD-10-PCS | Mod: 25,S$GLB,, | Performed by: INTERNAL MEDICINE

## 2020-10-20 PROCEDURE — 3008F BODY MASS INDEX DOCD: CPT | Mod: CPTII,S$GLB,, | Performed by: INTERNAL MEDICINE

## 2020-10-20 PROCEDURE — 3008F PR BODY MASS INDEX (BMI) DOCUMENTED: ICD-10-PCS | Mod: CPTII,S$GLB,, | Performed by: INTERNAL MEDICINE

## 2020-10-20 PROCEDURE — 90686 IIV4 VACC NO PRSV 0.5 ML IM: CPT | Mod: S$GLB,,, | Performed by: INTERNAL MEDICINE

## 2020-10-20 PROCEDURE — 90471 IMMUNIZATION ADMIN: CPT | Mod: S$GLB,,, | Performed by: INTERNAL MEDICINE

## 2020-10-20 PROCEDURE — 3077F SYST BP >= 140 MM HG: CPT | Mod: CPTII,S$GLB,, | Performed by: INTERNAL MEDICINE

## 2020-10-20 PROCEDURE — 99999 PR PBB SHADOW E&M-EST. PATIENT-LVL IV: ICD-10-PCS | Mod: PBBFAC,,, | Performed by: INTERNAL MEDICINE

## 2020-10-20 PROCEDURE — 99999 PR PBB SHADOW E&M-EST. PATIENT-LVL IV: CPT | Mod: PBBFAC,,, | Performed by: INTERNAL MEDICINE

## 2020-10-20 PROCEDURE — 3080F DIAST BP >= 90 MM HG: CPT | Mod: CPTII,S$GLB,, | Performed by: INTERNAL MEDICINE

## 2020-10-20 PROCEDURE — 90686 FLU VACCINE (QUAD) GREATER THAN OR EQUAL TO 3YO PRESERVATIVE FREE IM: ICD-10-PCS | Mod: S$GLB,,, | Performed by: INTERNAL MEDICINE

## 2020-10-20 NOTE — PATIENT INSTRUCTIONS
We were happy to see you today    For your Testing  Please have your labs and imaging test done at your earliest convience      For your Medication   Try the DASH DIET and Mediterranean Diet  We will adjust your medications base on your results of testing  For more information about side effects please visit medlineplus.gov      For your Referrals  none    For your Vaccinations    We gave your the following vaccines  Influenza          Please return to clinic in    4 weeks

## 2020-10-28 ENCOUNTER — TELEPHONE (OUTPATIENT)
Dept: ADMINISTRATIVE | Facility: OTHER | Age: 55
End: 2020-10-28

## 2020-12-08 ENCOUNTER — LAB VISIT (OUTPATIENT)
Dept: LAB | Facility: HOSPITAL | Age: 55
End: 2020-12-08
Attending: INTERNAL MEDICINE
Payer: COMMERCIAL

## 2020-12-08 DIAGNOSIS — Z13.220 SCREENING FOR HYPERLIPIDEMIA: ICD-10-CM

## 2020-12-08 DIAGNOSIS — R00.2 PALPITATION: ICD-10-CM

## 2020-12-08 DIAGNOSIS — Z11.59 ENCOUNTER FOR HEPATITIS C SCREENING TEST FOR LOW RISK PATIENT: ICD-10-CM

## 2020-12-08 DIAGNOSIS — R73.03 PREDIABETES: ICD-10-CM

## 2020-12-09 LAB
CHOLEST SERPL-MCNC: 196 MG/DL (ref 100–199)
HBA1C MFR BLD: 5.7 % (ref 4.8–5.6)
HCV AB S/CO SERPL IA: <0.1 S/CO RATIO (ref 0–0.9)
HDLC SERPL-MCNC: 40 MG/DL
LDLC SERPL CALC-MCNC: 145 MG/DL (ref 0–99)
MAGNESIUM SERPL-MCNC: 2.2 MG/DL (ref 1.6–2.3)
TRIGL SERPL-MCNC: 59 MG/DL (ref 0–149)
VLDLC SERPL CALC-MCNC: 11 MG/DL (ref 5–40)

## 2020-12-11 ENCOUNTER — PATIENT MESSAGE (OUTPATIENT)
Dept: OTHER | Facility: OTHER | Age: 55
End: 2020-12-11

## 2020-12-16 ENCOUNTER — PATIENT MESSAGE (OUTPATIENT)
Dept: INTERNAL MEDICINE | Facility: CLINIC | Age: 55
End: 2020-12-16

## 2020-12-22 ENCOUNTER — PATIENT MESSAGE (OUTPATIENT)
Dept: INTERNAL MEDICINE | Facility: CLINIC | Age: 55
End: 2020-12-22

## 2020-12-24 ENCOUNTER — TELEPHONE (OUTPATIENT)
Dept: INTERNAL MEDICINE | Facility: CLINIC | Age: 55
End: 2020-12-24

## 2020-12-24 DIAGNOSIS — E78.5 HYPERLIPIDEMIA, UNSPECIFIED HYPERLIPIDEMIA TYPE: Primary | ICD-10-CM

## 2020-12-24 RX ORDER — ROSUVASTATIN CALCIUM 5 MG/1
5 TABLET, COATED ORAL DAILY
Qty: 90 TABLET | Refills: 1 | Status: SHIPPED | OUTPATIENT
Start: 2020-12-24 | End: 2022-02-04

## 2020-12-24 NOTE — TELEPHONE ENCOUNTER
Called and discussed lab results    1. Elevated LDL - will resume statin  2. preDM -   Lab Results   Component Value Date    HGBA1C 5.7 (H) 12/08/2020     discusssed diet/exercise        Discussed with the patient the stress testing ,. He will rescheudle.       Medication List with Changes/Refills   Current Medications    CARVEDILOL (COREG) 3.125 MG TABLET    Take 1 tablet (3.125 mg total) by mouth 2 (two) times daily with meals.    LOSARTAN (COZAAR) 100 MG TABLET    One po daily 100mg is the correct dose   Changed and/or Refilled Medications    Modified Medication Previous Medication    ROSUVASTATIN (CRESTOR) 5 MG TABLET rosuvastatin (CRESTOR) 5 MG tablet       Take 1 tablet (5 mg total) by mouth once daily.    Take 1 tablet (5 mg total) by mouth once daily.   Discontinued Medications    KETOCONAZOLE (NIZORAL) 2 % CREAM    Apply topically once daily. At night time    KETOCONAZOLE (NIZORAL) 2 % SHAMPOO    Shampoo daily for 14 days; leave it on for 5 min; after 14 days, us 2x week prn    METHOCARBAMOL (ROBAXIN) 500 MG TAB    One po tid for 3-10 day with meals    NABUMETONE (RELAFEN) 750 MG TABLET    Take 1 tablet (750 mg total) by mouth 2 (two) times daily. For 3-10 days for musculoskeletal pain

## 2021-02-02 ENCOUNTER — OFFICE VISIT (OUTPATIENT)
Dept: INTERNAL MEDICINE | Facility: CLINIC | Age: 56
End: 2021-02-02
Payer: COMMERCIAL

## 2021-02-02 VITALS
WEIGHT: 218.25 LBS | SYSTOLIC BLOOD PRESSURE: 130 MMHG | DIASTOLIC BLOOD PRESSURE: 76 MMHG | OXYGEN SATURATION: 98 % | BODY MASS INDEX: 31.25 KG/M2 | HEART RATE: 80 BPM | TEMPERATURE: 97 F | HEIGHT: 70 IN

## 2021-02-02 DIAGNOSIS — R20.2 FACIAL TINGLING: ICD-10-CM

## 2021-02-02 DIAGNOSIS — R55 VASOVAGAL SYNCOPE: ICD-10-CM

## 2021-02-02 DIAGNOSIS — R21 RASH: ICD-10-CM

## 2021-02-02 DIAGNOSIS — R00.2 PALPITATIONS: ICD-10-CM

## 2021-02-02 PROCEDURE — 3078F PR MOST RECENT DIASTOLIC BLOOD PRESSURE < 80 MM HG: ICD-10-PCS | Mod: CPTII,S$GLB,, | Performed by: INTERNAL MEDICINE

## 2021-02-02 PROCEDURE — 3008F BODY MASS INDEX DOCD: CPT | Mod: CPTII,S$GLB,, | Performed by: INTERNAL MEDICINE

## 2021-02-02 PROCEDURE — 1126F AMNT PAIN NOTED NONE PRSNT: CPT | Mod: S$GLB,,, | Performed by: INTERNAL MEDICINE

## 2021-02-02 PROCEDURE — 3075F PR MOST RECENT SYSTOLIC BLOOD PRESS GE 130-139MM HG: ICD-10-PCS | Mod: CPTII,S$GLB,, | Performed by: INTERNAL MEDICINE

## 2021-02-02 PROCEDURE — 99214 OFFICE O/P EST MOD 30 MIN: CPT | Mod: S$GLB,,, | Performed by: INTERNAL MEDICINE

## 2021-02-02 PROCEDURE — 3008F PR BODY MASS INDEX (BMI) DOCUMENTED: ICD-10-PCS | Mod: CPTII,S$GLB,, | Performed by: INTERNAL MEDICINE

## 2021-02-02 PROCEDURE — 99214 PR OFFICE/OUTPT VISIT, EST, LEVL IV, 30-39 MIN: ICD-10-PCS | Mod: S$GLB,,, | Performed by: INTERNAL MEDICINE

## 2021-02-02 PROCEDURE — 99999 PR PBB SHADOW E&M-EST. PATIENT-LVL IV: CPT | Mod: PBBFAC,,, | Performed by: INTERNAL MEDICINE

## 2021-02-02 PROCEDURE — 3078F DIAST BP <80 MM HG: CPT | Mod: CPTII,S$GLB,, | Performed by: INTERNAL MEDICINE

## 2021-02-02 PROCEDURE — 99999 PR PBB SHADOW E&M-EST. PATIENT-LVL IV: ICD-10-PCS | Mod: PBBFAC,,, | Performed by: INTERNAL MEDICINE

## 2021-02-02 PROCEDURE — 1126F PR PAIN SEVERITY QUANTIFIED, NO PAIN PRESENT: ICD-10-PCS | Mod: S$GLB,,, | Performed by: INTERNAL MEDICINE

## 2021-02-02 PROCEDURE — 3075F SYST BP GE 130 - 139MM HG: CPT | Mod: CPTII,S$GLB,, | Performed by: INTERNAL MEDICINE

## 2021-02-02 RX ORDER — CLOTRIMAZOLE AND BETAMETHASONE DIPROPIONATE 10; .5 MG/ML; MG/ML
LOTION TOPICAL 2 TIMES DAILY
Qty: 30 ML | Refills: 0 | Status: SHIPPED | OUTPATIENT
Start: 2021-02-02 | End: 2022-01-28 | Stop reason: SDUPTHER

## 2021-02-02 RX ORDER — GABAPENTIN 100 MG/1
100 CAPSULE ORAL 3 TIMES DAILY PRN
Qty: 90 CAPSULE | Refills: 0 | Status: SHIPPED | OUTPATIENT
Start: 2021-02-02 | End: 2022-11-10

## 2021-02-03 ENCOUNTER — OFFICE VISIT (OUTPATIENT)
Dept: CARDIOLOGY | Facility: CLINIC | Age: 56
End: 2021-02-03
Payer: COMMERCIAL

## 2021-02-03 VITALS
DIASTOLIC BLOOD PRESSURE: 93 MMHG | HEIGHT: 70 IN | OXYGEN SATURATION: 98 % | WEIGHT: 218.56 LBS | HEART RATE: 78 BPM | BODY MASS INDEX: 31.29 KG/M2 | SYSTOLIC BLOOD PRESSURE: 150 MMHG

## 2021-02-03 DIAGNOSIS — I10 ESSENTIAL HYPERTENSION: Primary | ICD-10-CM

## 2021-02-03 DIAGNOSIS — R07.2 PRECORDIAL CHEST PAIN: ICD-10-CM

## 2021-02-03 DIAGNOSIS — R07.9 CHEST PAIN, UNSPECIFIED TYPE: ICD-10-CM

## 2021-02-03 DIAGNOSIS — E78.00 PURE HYPERCHOLESTEROLEMIA: ICD-10-CM

## 2021-02-03 DIAGNOSIS — R00.2 PALPITATIONS: ICD-10-CM

## 2021-02-03 PROCEDURE — 93005 ELECTROCARDIOGRAM TRACING: CPT | Mod: S$GLB,,, | Performed by: INTERNAL MEDICINE

## 2021-02-03 PROCEDURE — 3008F PR BODY MASS INDEX (BMI) DOCUMENTED: ICD-10-PCS | Mod: CPTII,S$GLB,, | Performed by: INTERNAL MEDICINE

## 2021-02-03 PROCEDURE — 93005 EKG 12-LEAD: ICD-10-PCS | Mod: S$GLB,,, | Performed by: INTERNAL MEDICINE

## 2021-02-03 PROCEDURE — 99999 PR PBB SHADOW E&M-EST. PATIENT-LVL IV: ICD-10-PCS | Mod: PBBFAC,,, | Performed by: INTERNAL MEDICINE

## 2021-02-03 PROCEDURE — 3077F SYST BP >= 140 MM HG: CPT | Mod: CPTII,S$GLB,, | Performed by: INTERNAL MEDICINE

## 2021-02-03 PROCEDURE — 99204 OFFICE O/P NEW MOD 45 MIN: CPT | Mod: S$GLB,,, | Performed by: INTERNAL MEDICINE

## 2021-02-03 PROCEDURE — 93010 EKG 12-LEAD: ICD-10-PCS | Mod: S$GLB,,, | Performed by: INTERNAL MEDICINE

## 2021-02-03 PROCEDURE — 3008F BODY MASS INDEX DOCD: CPT | Mod: CPTII,S$GLB,, | Performed by: INTERNAL MEDICINE

## 2021-02-03 PROCEDURE — 3080F PR MOST RECENT DIASTOLIC BLOOD PRESSURE >= 90 MM HG: ICD-10-PCS | Mod: CPTII,S$GLB,, | Performed by: INTERNAL MEDICINE

## 2021-02-03 PROCEDURE — 93010 ELECTROCARDIOGRAM REPORT: CPT | Mod: S$GLB,,, | Performed by: INTERNAL MEDICINE

## 2021-02-03 PROCEDURE — 99204 PR OFFICE/OUTPT VISIT, NEW, LEVL IV, 45-59 MIN: ICD-10-PCS | Mod: S$GLB,,, | Performed by: INTERNAL MEDICINE

## 2021-02-03 PROCEDURE — 3080F DIAST BP >= 90 MM HG: CPT | Mod: CPTII,S$GLB,, | Performed by: INTERNAL MEDICINE

## 2021-02-03 PROCEDURE — 3077F PR MOST RECENT SYSTOLIC BLOOD PRESSURE >= 140 MM HG: ICD-10-PCS | Mod: CPTII,S$GLB,, | Performed by: INTERNAL MEDICINE

## 2021-02-03 PROCEDURE — 1126F AMNT PAIN NOTED NONE PRSNT: CPT | Mod: S$GLB,,, | Performed by: INTERNAL MEDICINE

## 2021-02-03 PROCEDURE — 99999 PR PBB SHADOW E&M-EST. PATIENT-LVL IV: CPT | Mod: PBBFAC,,, | Performed by: INTERNAL MEDICINE

## 2021-02-03 PROCEDURE — 1126F PR PAIN SEVERITY QUANTIFIED, NO PAIN PRESENT: ICD-10-PCS | Mod: S$GLB,,, | Performed by: INTERNAL MEDICINE

## 2021-02-03 RX ORDER — HYDRALAZINE HYDROCHLORIDE 50 MG/1
50 TABLET, FILM COATED ORAL 2 TIMES DAILY
Qty: 180 TABLET | Refills: 4 | Status: SHIPPED | OUTPATIENT
Start: 2021-02-03 | End: 2022-11-10

## 2021-02-05 ENCOUNTER — TELEPHONE (OUTPATIENT)
Dept: CARDIOLOGY | Facility: CLINIC | Age: 56
End: 2021-02-05

## 2021-03-03 ENCOUNTER — OFFICE VISIT (OUTPATIENT)
Dept: INTERNAL MEDICINE | Facility: CLINIC | Age: 56
End: 2021-03-03
Payer: COMMERCIAL

## 2021-03-03 VITALS
DIASTOLIC BLOOD PRESSURE: 94 MMHG | HEIGHT: 71 IN | SYSTOLIC BLOOD PRESSURE: 139 MMHG | TEMPERATURE: 97 F | WEIGHT: 217.13 LBS | RESPIRATION RATE: 17 BRPM | HEART RATE: 75 BPM | BODY MASS INDEX: 30.4 KG/M2

## 2021-03-03 DIAGNOSIS — R73.03 PREDIABETES: ICD-10-CM

## 2021-03-03 DIAGNOSIS — E78.5 HYPERLIPIDEMIA, UNSPECIFIED HYPERLIPIDEMIA TYPE: ICD-10-CM

## 2021-03-03 DIAGNOSIS — E66.01 CLASS 2 SEVERE OBESITY DUE TO EXCESS CALORIES WITH SERIOUS COMORBIDITY IN ADULT, UNSPECIFIED BMI: ICD-10-CM

## 2021-03-03 DIAGNOSIS — I10 ESSENTIAL HYPERTENSION: Primary | ICD-10-CM

## 2021-03-03 PROCEDURE — 3008F BODY MASS INDEX DOCD: CPT | Mod: CPTII,S$GLB,, | Performed by: INTERNAL MEDICINE

## 2021-03-03 PROCEDURE — 3080F DIAST BP >= 90 MM HG: CPT | Mod: CPTII,S$GLB,, | Performed by: INTERNAL MEDICINE

## 2021-03-03 PROCEDURE — 3075F PR MOST RECENT SYSTOLIC BLOOD PRESS GE 130-139MM HG: ICD-10-PCS | Mod: CPTII,S$GLB,, | Performed by: INTERNAL MEDICINE

## 2021-03-03 PROCEDURE — 3080F PR MOST RECENT DIASTOLIC BLOOD PRESSURE >= 90 MM HG: ICD-10-PCS | Mod: CPTII,S$GLB,, | Performed by: INTERNAL MEDICINE

## 2021-03-03 PROCEDURE — 99999 PR PBB SHADOW E&M-EST. PATIENT-LVL IV: CPT | Mod: PBBFAC,,, | Performed by: INTERNAL MEDICINE

## 2021-03-03 PROCEDURE — 3075F SYST BP GE 130 - 139MM HG: CPT | Mod: CPTII,S$GLB,, | Performed by: INTERNAL MEDICINE

## 2021-03-03 PROCEDURE — 3008F PR BODY MASS INDEX (BMI) DOCUMENTED: ICD-10-PCS | Mod: CPTII,S$GLB,, | Performed by: INTERNAL MEDICINE

## 2021-03-03 PROCEDURE — 99214 PR OFFICE/OUTPT VISIT, EST, LEVL IV, 30-39 MIN: ICD-10-PCS | Mod: S$GLB,,, | Performed by: INTERNAL MEDICINE

## 2021-03-03 PROCEDURE — 99214 OFFICE O/P EST MOD 30 MIN: CPT | Mod: S$GLB,,, | Performed by: INTERNAL MEDICINE

## 2021-03-03 PROCEDURE — 99999 PR PBB SHADOW E&M-EST. PATIENT-LVL IV: ICD-10-PCS | Mod: PBBFAC,,, | Performed by: INTERNAL MEDICINE

## 2021-03-03 RX ORDER — CHLORTHALIDONE 25 MG/1
12.5 TABLET ORAL DAILY
Qty: 90 TABLET | Refills: 0 | Status: SHIPPED | OUTPATIENT
Start: 2021-03-03 | End: 2021-08-28

## 2021-03-16 RX ORDER — LOSARTAN POTASSIUM 100 MG/1
TABLET ORAL
Qty: 90 TABLET | Refills: 3 | OUTPATIENT
Start: 2021-03-16

## 2021-03-17 ENCOUNTER — CLINICAL SUPPORT (OUTPATIENT)
Dept: FAMILY MEDICINE | Facility: CLINIC | Age: 56
End: 2021-03-17
Payer: COMMERCIAL

## 2021-03-17 ENCOUNTER — LAB VISIT (OUTPATIENT)
Dept: LAB | Facility: HOSPITAL | Age: 56
End: 2021-03-17
Attending: INTERNAL MEDICINE
Payer: COMMERCIAL

## 2021-03-17 VITALS
SYSTOLIC BLOOD PRESSURE: 112 MMHG | HEART RATE: 76 BPM | OXYGEN SATURATION: 98 % | DIASTOLIC BLOOD PRESSURE: 80 MMHG | TEMPERATURE: 97 F

## 2021-03-17 DIAGNOSIS — R03.0 ELEVATED BLOOD PRESSURE READING: Primary | ICD-10-CM

## 2021-03-17 DIAGNOSIS — E78.5 HYPERLIPIDEMIA, UNSPECIFIED HYPERLIPIDEMIA TYPE: ICD-10-CM

## 2021-03-17 PROCEDURE — 80048 BASIC METABOLIC PNL TOTAL CA: CPT | Performed by: INTERNAL MEDICINE

## 2021-03-17 PROCEDURE — 99999 PR PBB SHADOW E&M-EST. PATIENT-LVL II: CPT | Mod: PBBFAC,,,

## 2021-03-17 PROCEDURE — 99999 PR PBB SHADOW E&M-EST. PATIENT-LVL II: ICD-10-PCS | Mod: PBBFAC,,,

## 2021-03-17 PROCEDURE — 36415 COLL VENOUS BLD VENIPUNCTURE: CPT | Mod: PO | Performed by: INTERNAL MEDICINE

## 2021-03-18 LAB
ANION GAP SERPL CALC-SCNC: 10 MMOL/L (ref 8–16)
BUN SERPL-MCNC: 14 MG/DL (ref 6–20)
CALCIUM SERPL-MCNC: 9 MG/DL (ref 8.7–10.5)
CHLORIDE SERPL-SCNC: 100 MMOL/L (ref 95–110)
CO2 SERPL-SCNC: 29 MMOL/L (ref 23–29)
CREAT SERPL-MCNC: 1.2 MG/DL (ref 0.5–1.4)
EST. GFR  (AFRICAN AMERICAN): >60 ML/MIN/1.73 M^2
EST. GFR  (NON AFRICAN AMERICAN): >60 ML/MIN/1.73 M^2
GLUCOSE SERPL-MCNC: 95 MG/DL (ref 70–110)
POTASSIUM SERPL-SCNC: 3.6 MMOL/L (ref 3.5–5.1)
SODIUM SERPL-SCNC: 139 MMOL/L (ref 136–145)

## 2021-03-21 ENCOUNTER — PATIENT MESSAGE (OUTPATIENT)
Dept: INTERNAL MEDICINE | Facility: CLINIC | Age: 56
End: 2021-03-21

## 2021-03-21 DIAGNOSIS — I10 ESSENTIAL HYPERTENSION: Primary | ICD-10-CM

## 2021-03-22 ENCOUNTER — PATIENT MESSAGE (OUTPATIENT)
Dept: INTERNAL MEDICINE | Facility: CLINIC | Age: 56
End: 2021-03-22

## 2021-03-22 RX ORDER — LOSARTAN POTASSIUM 100 MG/1
TABLET ORAL
Qty: 90 TABLET | Refills: 1 | Status: SHIPPED | OUTPATIENT
Start: 2021-03-22 | End: 2021-09-02 | Stop reason: SDUPTHER

## 2021-06-12 ENCOUNTER — PATIENT MESSAGE (OUTPATIENT)
Dept: CARDIOLOGY | Facility: CLINIC | Age: 56
End: 2021-06-12

## 2021-06-15 DIAGNOSIS — R07.2 PRECORDIAL PAIN: ICD-10-CM

## 2021-06-17 ENCOUNTER — TELEPHONE (OUTPATIENT)
Dept: CARDIOLOGY | Facility: CLINIC | Age: 56
End: 2021-06-17

## 2021-06-17 DIAGNOSIS — R07.2 PRECORDIAL PAIN: ICD-10-CM

## 2021-06-17 DIAGNOSIS — I10 ESSENTIAL HYPERTENSION: Primary | ICD-10-CM

## 2021-06-18 ENCOUNTER — TELEPHONE (OUTPATIENT)
Dept: CARDIOLOGY | Facility: CLINIC | Age: 56
End: 2021-06-18

## 2021-06-23 ENCOUNTER — HOSPITAL ENCOUNTER (OUTPATIENT)
Dept: CARDIOLOGY | Facility: HOSPITAL | Age: 56
Discharge: HOME | End: 2021-06-23
Attending: INTERNAL MEDICINE
Payer: COMMERCIAL

## 2021-06-23 VITALS
DIASTOLIC BLOOD PRESSURE: 79 MMHG | BODY MASS INDEX: 30.38 KG/M2 | WEIGHT: 217 LBS | SYSTOLIC BLOOD PRESSURE: 169 MMHG | HEART RATE: 72 BPM | HEIGHT: 71 IN

## 2021-06-23 DIAGNOSIS — I10 ESSENTIAL HYPERTENSION: ICD-10-CM

## 2021-06-23 DIAGNOSIS — R07.2 PRECORDIAL PAIN: ICD-10-CM

## 2021-06-23 LAB
ASCENDING AORTA: 3.71 CM
AV INDEX (PROSTH): 0.86
AV MEAN GRADIENT: 4 MMHG
AV PEAK GRADIENT: 8 MMHG
AV VALVE AREA: 2.77 CM2
AV VELOCITY RATIO: 0.77
BSA FOR ECHO PROCEDURE: 2.22 M2
CV ECHO LV RWT: 0.23 CM
DOP CALC AO PEAK VEL: 1.38 M/S
DOP CALC AO VTI: 25.64 CM
DOP CALC LVOT AREA: 3.2 CM2
DOP CALC LVOT DIAMETER: 2.03 CM
DOP CALC LVOT PEAK VEL: 1.06 M/S
DOP CALC LVOT STROKE VOLUME: 71.1 CM3
DOP CALCLVOT PEAK VEL VTI: 21.98 CM
E WAVE DECELERATION TIME: 162 MSEC
E/A RATIO: 1.04
E/E' RATIO: 9.29 M/S
ECHO LV POSTERIOR WALL: 0.54 CM (ref 0.6–1.1)
EJECTION FRACTION: 55 %
FRACTIONAL SHORTENING: 28 % (ref 28–44)
INTERVENTRICULAR SEPTUM: 0.68 CM (ref 0.6–1.1)
IVRT: 74.22 MSEC
LA MAJOR: 5.76 CM
LA MINOR: 5.75 CM
LA WIDTH: 3.78 CM
LEFT ATRIUM SIZE: 3.52 CM
LEFT ATRIUM VOLUME INDEX MOD: 21.2 ML/M2
LEFT ATRIUM VOLUME INDEX: 29.9 ML/M2
LEFT ATRIUM VOLUME MOD: 46.25 CM3
LEFT ATRIUM VOLUME: 65.09 CM3
LEFT INTERNAL DIMENSION IN SYSTOLE: 3.32 CM (ref 2.1–4)
LEFT VENTRICLE DIASTOLIC VOLUME INDEX: 45.2 ML/M2
LEFT VENTRICLE DIASTOLIC VOLUME: 98.54 ML
LEFT VENTRICLE MASS INDEX: 39 G/M2
LEFT VENTRICLE SYSTOLIC VOLUME INDEX: 20.5 ML/M2
LEFT VENTRICLE SYSTOLIC VOLUME: 44.67 ML
LEFT VENTRICULAR INTERNAL DIMENSION IN DIASTOLE: 4.62 CM (ref 3.5–6)
LEFT VENTRICULAR MASS: 84.27 G
LV LATERAL E/E' RATIO: 7.9 M/S
LV SEPTAL E/E' RATIO: 11.29 M/S
MV A" WAVE DURATION": 8.56 MSEC
MV PEAK A VEL: 0.76 M/S
MV PEAK E VEL: 0.79 M/S
MV STENOSIS PRESSURE HALF TIME: 46.98 MS
MV VALVE AREA P 1/2 METHOD: 4.68 CM2
PISA TR MAX VEL: 2.21 M/S
PULM VEIN S/D RATIO: 0.7
PV PEAK D VEL: 0.57 M/S
PV PEAK S VEL: 0.4 M/S
RA MAJOR: 4.48 CM
RA PRESSURE: 3 MMHG
RA WIDTH: 3.56 CM
RIGHT VENTRICULAR END-DIASTOLIC DIMENSION: 3.18 CM
RV TISSUE DOPPLER FREE WALL SYSTOLIC VELOCITY 1 (APICAL 4 CHAMBER VIEW): 10.51 CM/S
SINUS: 3.06 CM
STJ: 2.81 CM
TDI LATERAL: 0.1 M/S
TDI SEPTAL: 0.07 M/S
TDI: 0.09 M/S
TR MAX PG: 20 MMHG
TRICUSPID ANNULAR PLANE SYSTOLIC EXCURSION: 1.71 CM
TV REST PULMONARY ARTERY PRESSURE: 23 MMHG

## 2021-06-23 PROCEDURE — 93306 TTE W/DOPPLER COMPLETE: CPT | Mod: 26,,, | Performed by: INTERNAL MEDICINE

## 2021-06-23 PROCEDURE — 93306 ECHO (CUPID ONLY): ICD-10-PCS | Mod: 26,,, | Performed by: INTERNAL MEDICINE

## 2021-06-23 PROCEDURE — 93306 TTE W/DOPPLER COMPLETE: CPT

## 2021-06-25 ENCOUNTER — PATIENT MESSAGE (OUTPATIENT)
Dept: CARDIOLOGY | Facility: CLINIC | Age: 56
End: 2021-06-25

## 2021-06-28 ENCOUNTER — TELEPHONE (OUTPATIENT)
Dept: CARDIOLOGY | Facility: CLINIC | Age: 56
End: 2021-06-28

## 2021-07-01 ENCOUNTER — PATIENT OUTREACH (OUTPATIENT)
Dept: ADMINISTRATIVE | Facility: OTHER | Age: 56
End: 2021-07-01

## 2021-08-27 DIAGNOSIS — I10 ESSENTIAL HYPERTENSION: ICD-10-CM

## 2021-08-27 RX ORDER — CHLORTHALIDONE 25 MG/1
TABLET ORAL
Qty: 90 TABLET | Refills: 0 | Status: CANCELLED | OUTPATIENT
Start: 2021-08-27

## 2021-08-29 RX ORDER — CHLORTHALIDONE 25 MG/1
12.5 TABLET ORAL DAILY
Qty: 14 TABLET | Refills: 0 | Status: SHIPPED | OUTPATIENT
Start: 2021-08-29 | End: 2021-09-02 | Stop reason: SDUPTHER

## 2021-09-02 ENCOUNTER — PATIENT MESSAGE (OUTPATIENT)
Dept: INTERNAL MEDICINE | Facility: CLINIC | Age: 56
End: 2021-09-02

## 2021-09-02 DIAGNOSIS — I10 ESSENTIAL HYPERTENSION: ICD-10-CM

## 2021-09-02 RX ORDER — CHLORTHALIDONE 25 MG/1
12.5 TABLET ORAL DAILY
Qty: 30 TABLET | Refills: 0 | Status: SHIPPED | OUTPATIENT
Start: 2021-09-02 | End: 2021-11-08

## 2021-09-02 RX ORDER — LOSARTAN POTASSIUM 100 MG/1
TABLET ORAL
Qty: 90 TABLET | Refills: 0 | Status: SHIPPED | OUTPATIENT
Start: 2021-09-02 | End: 2022-01-31

## 2021-10-13 ENCOUNTER — PATIENT MESSAGE (OUTPATIENT)
Dept: ADMINISTRATIVE | Facility: HOSPITAL | Age: 56
End: 2021-10-13
Payer: COMMERCIAL

## 2021-10-13 ENCOUNTER — PATIENT OUTREACH (OUTPATIENT)
Dept: ADMINISTRATIVE | Facility: HOSPITAL | Age: 56
End: 2021-10-13

## 2021-11-07 DIAGNOSIS — I10 ESSENTIAL HYPERTENSION: ICD-10-CM

## 2021-11-08 RX ORDER — CHLORTHALIDONE 25 MG/1
TABLET ORAL
Qty: 45 TABLET | Refills: 1 | Status: SHIPPED | OUTPATIENT
Start: 2021-11-08 | End: 2022-04-27 | Stop reason: SDUPTHER

## 2021-12-03 ENCOUNTER — TELEPHONE (OUTPATIENT)
Dept: INTERNAL MEDICINE | Facility: CLINIC | Age: 56
End: 2021-12-03
Payer: COMMERCIAL

## 2021-12-03 ENCOUNTER — OFFICE VISIT (OUTPATIENT)
Dept: URGENT CARE | Facility: CLINIC | Age: 56
End: 2021-12-03
Payer: COMMERCIAL

## 2021-12-03 VITALS
HEIGHT: 71 IN | WEIGHT: 211 LBS | RESPIRATION RATE: 23 BRPM | HEART RATE: 110 BPM | BODY MASS INDEX: 29.54 KG/M2 | TEMPERATURE: 101 F | OXYGEN SATURATION: 98 % | DIASTOLIC BLOOD PRESSURE: 86 MMHG | SYSTOLIC BLOOD PRESSURE: 137 MMHG

## 2021-12-03 DIAGNOSIS — J06.9 UPPER RESPIRATORY TRACT INFECTION, UNSPECIFIED TYPE: ICD-10-CM

## 2021-12-03 DIAGNOSIS — R50.9 FEVER, UNSPECIFIED FEVER CAUSE: Primary | ICD-10-CM

## 2021-12-03 LAB
CTP QC/QA: YES
CTP QC/QA: YES
POC MOLECULAR INFLUENZA A AGN: NEGATIVE
POC MOLECULAR INFLUENZA B AGN: NEGATIVE
SARS-COV-2 RDRP RESP QL NAA+PROBE: NEGATIVE

## 2021-12-03 PROCEDURE — 87502 INFLUENZA DNA AMP PROBE: CPT | Mod: QW,S$GLB,, | Performed by: NURSE PRACTITIONER

## 2021-12-03 PROCEDURE — 87502 POCT INFLUENZA A/B MOLECULAR: ICD-10-PCS | Mod: QW,S$GLB,, | Performed by: NURSE PRACTITIONER

## 2021-12-03 PROCEDURE — 4010F PR ACE/ARB THEARPY RXD/TAKEN: ICD-10-PCS | Mod: CPTII,S$GLB,, | Performed by: NURSE PRACTITIONER

## 2021-12-03 PROCEDURE — 99213 OFFICE O/P EST LOW 20 MIN: CPT | Mod: S$GLB,,, | Performed by: NURSE PRACTITIONER

## 2021-12-03 PROCEDURE — 4010F ACE/ARB THERAPY RXD/TAKEN: CPT | Mod: CPTII,S$GLB,, | Performed by: NURSE PRACTITIONER

## 2021-12-03 PROCEDURE — U0002: ICD-10-PCS | Mod: QW,S$GLB,, | Performed by: NURSE PRACTITIONER

## 2021-12-03 PROCEDURE — 99213 PR OFFICE/OUTPT VISIT, EST, LEVL III, 20-29 MIN: ICD-10-PCS | Mod: S$GLB,,, | Performed by: NURSE PRACTITIONER

## 2021-12-03 PROCEDURE — U0002 COVID-19 LAB TEST NON-CDC: HCPCS | Mod: QW,S$GLB,, | Performed by: NURSE PRACTITIONER

## 2021-12-03 RX ORDER — METHOCARBAMOL 500 MG/1
TABLET, FILM COATED ORAL
COMMUNITY
End: 2022-11-10

## 2021-12-03 RX ORDER — KETOCONAZOLE 20 MG/ML
SHAMPOO, SUSPENSION TOPICAL
COMMUNITY
End: 2022-11-10

## 2021-12-03 RX ORDER — KETOCONAZOLE 20 MG/G
CREAM TOPICAL
COMMUNITY
End: 2022-11-10

## 2021-12-03 RX ORDER — NABUMETONE 750 MG/1
TABLET, FILM COATED ORAL
COMMUNITY
End: 2022-11-10

## 2021-12-03 RX ORDER — CARVEDILOL 3.12 MG/1
TABLET ORAL
COMMUNITY
End: 2022-11-10

## 2022-01-23 DIAGNOSIS — I10 ESSENTIAL HYPERTENSION: ICD-10-CM

## 2022-01-23 DIAGNOSIS — E78.5 HYPERLIPIDEMIA, UNSPECIFIED HYPERLIPIDEMIA TYPE: ICD-10-CM

## 2022-01-23 NOTE — TELEPHONE ENCOUNTER
Care Due:                  Date            Visit Type   Department     Provider  --------------------------------------------------------------------------------                                MYCHART                              FOLLOWUP/OF  [facility] INTERNAL  Last Visit: 03-      DARRIONE VISIT   MEDICINE       Guy Sena  Next Visit: None Scheduled  None         None Found                                                            Last  Test          Frequency    Reason                     Performed    Due Date  --------------------------------------------------------------------------------    Office Visit  12 months..  chlorthalidone, losartan.  03- 02-    CMP.........  12 months..  chlorthalidone, losartan.  Not Found    Overdue    Powered by [employer]. Reference number: [id number].   1/23/2022 11:04:03 AM CST

## 2022-01-27 ENCOUNTER — PATIENT MESSAGE (OUTPATIENT)
Dept: INTERNAL MEDICINE | Facility: CLINIC | Age: 57
End: 2022-01-27
Payer: COMMERCIAL

## 2022-01-28 DIAGNOSIS — R21 RASH: ICD-10-CM

## 2022-01-28 RX ORDER — CLOTRIMAZOLE AND BETAMETHASONE DIPROPIONATE 10; .5 MG/ML; MG/ML
LOTION TOPICAL 2 TIMES DAILY
Qty: 30 ML | Refills: 0 | Status: SHIPPED | OUTPATIENT
Start: 2022-01-28

## 2022-01-30 ENCOUNTER — PATIENT MESSAGE (OUTPATIENT)
Dept: INTERNAL MEDICINE | Facility: CLINIC | Age: 57
End: 2022-01-30
Payer: COMMERCIAL

## 2022-02-04 ENCOUNTER — TELEPHONE (OUTPATIENT)
Dept: INTERNAL MEDICINE | Facility: CLINIC | Age: 57
End: 2022-02-04
Payer: COMMERCIAL

## 2022-02-04 ENCOUNTER — PATIENT MESSAGE (OUTPATIENT)
Dept: INTERNAL MEDICINE | Facility: CLINIC | Age: 57
End: 2022-02-04
Payer: COMMERCIAL

## 2022-02-04 RX ORDER — ROSUVASTATIN CALCIUM 5 MG/1
TABLET, COATED ORAL
Qty: 14 TABLET | Refills: 0 | Status: SHIPPED | OUTPATIENT
Start: 2022-02-04 | End: 2022-11-10 | Stop reason: SDUPTHER

## 2022-02-04 RX ORDER — LOSARTAN POTASSIUM 100 MG/1
TABLET ORAL
Qty: 90 TABLET | Refills: 0 | OUTPATIENT
Start: 2022-02-04

## 2022-02-04 NOTE — TELEPHONE ENCOUNTER
----- Message from Lori Herman sent at 2/4/2022 10:14 AM CST -----  Regarding: Medication Refill  Good morning Viri,    The patient came into the clinic today in regards to his blood pressure medication in which he just received a 14 day supply for. Patient wanted to know if he would need to make an appointment to see Dr. Sena for additional refills. The blood pressure and pharmacy is listed below. The patient can be reached at [phone].      [id number] - [place], [place] - [address] AT [facility]      losartan (COZAAR) 100 MG tablet 14 tablet   Sig: TAKE 1 TABLET BY MOUTH DAILY      Thank you  Lori Herman  Patient Access Representative   [facility]

## 2022-02-04 NOTE — TELEPHONE ENCOUNTER
Patient was informed he needs to have lab work and follow up before he can receive a refill on his medication. Pt confirmed appointments and voiced understanding.

## 2022-02-05 NOTE — TELEPHONE ENCOUNTER
Refill Routing Note   Medication(s) are not appropriate for processing by [facility] for the following reason(s):      - Required laboratory values are outdated    [facility] action(s):  Quick Discontinue  Defer Medication-related problems identified:   Requires labs  Requires appointment     Medication Therapy Plan: labs outdated, please advise; defer-crestor //quick dc: duplicate   --->Care Gap information included in message below if applicable.   Medication reconciliation completed: No   Automatic Epic Generated Protocol Data:        Requested Prescriptions   Pending Prescriptions Disp Refills    rosuvastatin (CRESTOR) 5 MG tablet [Pharmacy Med Name: ROSUVASTATIN 5MG TABLETS] 90 tablet 0     Sig: TAKE 1 TABLET(5 MG) BY MOUTH EVERY DAY       Cardiovascular:  Antilipid - Statins Failed - 1/23/2022 11:03 AM        Failed - ALT is 131 or below and within 360 days     ALT   Date Value Ref Range Status   08/19/2020 27 10 - 44 U/L Final   01/14/2020 47 (H) 10 - 44 U/L Final   08/08/2018 38 10 - 44 U/L Final              Failed - AST is 119 or below and within 360 days     AST   Date Value Ref Range Status   08/19/2020 21 10 - 40 U/L Final   01/14/2020 28 10 - 40 U/L Final   08/08/2018 24 10 - 40 U/L Final              Failed - Total Cholesterol within 360 days     Lab Results   Component Value Date    CHOL 196 12/08/2020    CHOL 221 (H) 01/14/2020    CHOL 208 (H) 08/08/2018              Failed - LDL within 360 days     LDL Calculated   Date Value Ref Range Status   12/08/2020 145 (H) 0 - 99 mg/dL Final            Failed - HDL within 360 days     HDL   Date Value Ref Range Status   12/08/2020 40 >39 mg/dL Final            Failed - Triglycerides within 360 days     Lab Results   Component Value Date    TRIG 59 12/08/2020    TRIG 73 01/14/2020    TRIG 63 08/08/2018              Passed - Patient is at least 18 years old        Passed - Valid encounter within last 15 months     Recent Visits  Date Type Provider Dept    03/03/21 Office Visit Guy Sena III, MD [facility] Internal Medicine   10/20/20 Office Visit Guy Sena III, MD [facility] Internal Medicine   08/19/20 Office Visit Kenan Reyes MD [facility]   03/23/20 Office Visit Kenan Reyes MD [facility]   Showing recent visits within past 720 days and meeting all other requirements  Future Appointments  No visits were found meeting these conditions.  Showing future appointments within next 150 days and meeting all other requirements      Future Appointments              In 3 days LAB, [facility] [place] - [facility], [place]    In 6 days MD Negro Moffett III - Internal Medicine, [place]                 Refused Prescriptions Disp Refills    losartan (COZAAR) 100 MG tablet [Pharmacy Med Name: LOSARTAN 100MG TABLETS] 90 tablet 0     Sig: TAKE 1 TABLET BY MOUTH DAILY       Cardiovascular:  Angiotensin Receptor Blockers Passed - 2/4/2022  6:02 PM        Passed - Patient is at least 18 years old        Passed - Last BP in normal range within 360 days     BP Readings from Last 1 Encounters:   12/03/21 137/86               Passed - Valid encounter within last 15 months     Recent Visits  Date Type Provider Dept   03/03/21 Office Visit Guy Sena III, MD [facility] Internal Medicine   10/20/20 Office Visit Guy Sena III, MD [facility] Internal Medicine   08/19/20 Office Visit Kenan Reyes MD [facility]   03/23/20 Office Visit Kenan Reyes MD [facility]   Showing recent visits within past 720 days and meeting all other requirements  Future Appointments  No visits were found meeting these conditions.  Showing future appointments within next 150 days and meeting all other requirements      Future Appointments              In 3 days LAB, [facility] [place] - [facility] [place]    In 6 days MD Negro Moffett III - Internal Medicine, [place]                Passed - Cr is 1.39 or below and within 360 days     Lab Results   Component  Value Date    CREATININE 1.2 03/17/2021    CREATININE 1.1 08/19/2020    CREATININE 1.1 01/14/2020              Passed - K is 5.2 or below and within 360 days     Potassium   Date Value Ref Range Status   03/17/2021 3.6 3.5 - 5.1 mmol/L Final   08/19/2020 4.2 3.5 - 5.1 mmol/L Final   01/14/2020 4.2 3.5 - 5.1 mmol/L Final              Passed - eGFR within 360 days     Lab Results   Component Value Date    EGFRNONAA >60.0 03/17/2021    EGFRNONAA >60.0 08/19/2020    EGFRNONAA >60.0 01/14/2020                      Appointments  past 12m or future 3m with PCP    Date Provider   Last Visit   3/3/2021 Guy Sena III, MD   Next Visit   1/28/2022 Guy Sena III, MD   ED visits in past 90 days: 0        Note composed:6:05 PM 02/04/2022        Pended Medication(s)   Requested Prescriptions     Pending Prescriptions Disp Refills    rosuvastatin (CRESTOR) 5 MG tablet [Pharmacy Med Name: ROSUVASTATIN 5MG TABLETS] 90 tablet 0     Sig: TAKE 1 TABLET(5 MG) BY MOUTH EVERY DAY     Refused Prescriptions Disp Refills    losartan (COZAAR) 100 MG tablet [Pharmacy Med Name: LOSARTAN 100MG TABLETS] 90 tablet 0     Sig: TAKE 1 TABLET BY MOUTH DAILY     Refused By: ARTIS DAVIS     Reason for Refusal: Duplicate        Duplicate Pended Encounter(s)/ Last Prescribed Details:    losartan (COZAAR) 100 MG tablet 14 tablet 0 1/31/2022  No   Sig: TAKE 1 TABLET BY MOUTH DAILY   Sent to pharmacy as: losartan (COZAAR) 100 MG tablet   Class: Normal   Order: [id number]   Date/Time Signed: 1/31/2022 16:23       E-Prescribing Status: Receipt confirmed by pharmacy (1/31/2022  4:23 PM CST)       Ordering Encounter Report    Associated Reports   View Encounter              Note composed:6:05 PM 02/04/2022

## 2022-02-05 NOTE — TELEPHONE ENCOUNTER
Provider Staff:     Action is required for this patient.   Please see care gap opportunities below in Care Due Message.     Thanks!  [facility]     Appointments      Date Provider   Last Visit   3/3/2021 Guy Sena III, MD   Next Visit   1/28/2022 Guy Sena III, MD     Note composed:6:07 PM 02/04/2022

## 2022-02-07 ENCOUNTER — LAB VISIT (OUTPATIENT)
Dept: LAB | Facility: HOSPITAL | Age: 57
End: 2022-02-07
Attending: INTERNAL MEDICINE
Payer: COMMERCIAL

## 2022-02-07 DIAGNOSIS — E78.5 HYPERLIPIDEMIA, UNSPECIFIED HYPERLIPIDEMIA TYPE: ICD-10-CM

## 2022-02-07 DIAGNOSIS — E66.01 CLASS 2 SEVERE OBESITY DUE TO EXCESS CALORIES WITH SERIOUS COMORBIDITY IN ADULT, UNSPECIFIED BMI: ICD-10-CM

## 2022-02-07 DIAGNOSIS — R73.03 PREDIABETES: ICD-10-CM

## 2022-02-07 LAB
ALBUMIN SERPL BCP-MCNC: 3.8 G/DL (ref 3.5–5.2)
ALP SERPL-CCNC: 47 U/L (ref 55–135)
ALT SERPL W/O P-5'-P-CCNC: 43 U/L (ref 10–44)
AST SERPL-CCNC: 26 U/L (ref 10–40)
BILIRUB DIRECT SERPL-MCNC: 0.2 MG/DL (ref 0.1–0.3)
BILIRUB SERPL-MCNC: 0.6 MG/DL (ref 0.1–1)
CHOLEST SERPL-MCNC: 178 MG/DL (ref 120–199)
CHOLEST/HDLC SERPL: 4.1 {RATIO} (ref 2–5)
ESTIMATED AVG GLUCOSE: 114 MG/DL (ref 68–131)
HBA1C MFR BLD: 5.6 % (ref 4–5.6)
HDLC SERPL-MCNC: 43 MG/DL (ref 40–75)
HDLC SERPL: 24.2 % (ref 20–50)
LDLC SERPL CALC-MCNC: 118.8 MG/DL (ref 63–159)
NONHDLC SERPL-MCNC: 135 MG/DL
PROT SERPL-MCNC: 7.4 G/DL (ref 6–8.4)
TRIGL SERPL-MCNC: 81 MG/DL (ref 30–150)

## 2022-02-07 PROCEDURE — 36415 COLL VENOUS BLD VENIPUNCTURE: CPT | Mod: PO | Performed by: INTERNAL MEDICINE

## 2022-02-07 PROCEDURE — 80061 LIPID PANEL: CPT | Performed by: INTERNAL MEDICINE

## 2022-02-07 PROCEDURE — 80076 HEPATIC FUNCTION PANEL: CPT | Performed by: INTERNAL MEDICINE

## 2022-02-07 PROCEDURE — 83036 HEMOGLOBIN GLYCOSYLATED A1C: CPT | Performed by: INTERNAL MEDICINE

## 2022-02-10 ENCOUNTER — OFFICE VISIT (OUTPATIENT)
Dept: INTERNAL MEDICINE | Facility: CLINIC | Age: 57
End: 2022-02-10
Payer: COMMERCIAL

## 2022-02-10 VITALS
WEIGHT: 221.81 LBS | DIASTOLIC BLOOD PRESSURE: 90 MMHG | SYSTOLIC BLOOD PRESSURE: 148 MMHG | HEART RATE: 78 BPM | OXYGEN SATURATION: 98 % | HEIGHT: 71 IN | BODY MASS INDEX: 31.05 KG/M2

## 2022-02-10 DIAGNOSIS — E66.01 CLASS 2 SEVERE OBESITY DUE TO EXCESS CALORIES WITH SERIOUS COMORBIDITY IN ADULT, UNSPECIFIED BMI: ICD-10-CM

## 2022-02-10 DIAGNOSIS — I10 ESSENTIAL HYPERTENSION: Primary | ICD-10-CM

## 2022-02-10 DIAGNOSIS — Z23 NEED FOR VACCINATION: ICD-10-CM

## 2022-02-10 PROBLEM — E80.20 UNSPECIFIED PORPHYRIA: Status: RESOLVED | Noted: 2022-02-10 | Resolved: 2022-02-10

## 2022-02-10 PROBLEM — E80.20 UNSPECIFIED PORPHYRIA: Status: ACTIVE | Noted: 2022-02-10

## 2022-02-10 PROBLEM — E66.812 CLASS 2 SEVERE OBESITY DUE TO EXCESS CALORIES WITH SERIOUS COMORBIDITY IN ADULT, UNSPECIFIED BMI: Status: ACTIVE | Noted: 2022-02-10

## 2022-02-10 PROCEDURE — 3044F PR MOST RECENT HEMOGLOBIN A1C LEVEL <7.0%: ICD-10-PCS | Mod: CPTII,S$GLB,, | Performed by: INTERNAL MEDICINE

## 2022-02-10 PROCEDURE — 3044F HG A1C LEVEL LT 7.0%: CPT | Mod: CPTII,S$GLB,, | Performed by: INTERNAL MEDICINE

## 2022-02-10 PROCEDURE — 99999 PR PBB SHADOW E&M-EST. PATIENT-LVL IV: ICD-10-PCS | Mod: PBBFAC,,, | Performed by: INTERNAL MEDICINE

## 2022-02-10 PROCEDURE — 3008F BODY MASS INDEX DOCD: CPT | Mod: CPTII,S$GLB,, | Performed by: INTERNAL MEDICINE

## 2022-02-10 PROCEDURE — 3077F PR MOST RECENT SYSTOLIC BLOOD PRESSURE >= 140 MM HG: ICD-10-PCS | Mod: CPTII,S$GLB,, | Performed by: INTERNAL MEDICINE

## 2022-02-10 PROCEDURE — 3080F DIAST BP >= 90 MM HG: CPT | Mod: CPTII,S$GLB,, | Performed by: INTERNAL MEDICINE

## 2022-02-10 PROCEDURE — 99214 OFFICE O/P EST MOD 30 MIN: CPT | Mod: S$GLB,,, | Performed by: INTERNAL MEDICINE

## 2022-02-10 PROCEDURE — 3077F SYST BP >= 140 MM HG: CPT | Mod: CPTII,S$GLB,, | Performed by: INTERNAL MEDICINE

## 2022-02-10 PROCEDURE — 1159F MED LIST DOCD IN RCRD: CPT | Mod: CPTII,S$GLB,, | Performed by: INTERNAL MEDICINE

## 2022-02-10 PROCEDURE — 99999 PR PBB SHADOW E&M-EST. PATIENT-LVL IV: CPT | Mod: PBBFAC,,, | Performed by: INTERNAL MEDICINE

## 2022-02-10 PROCEDURE — 3080F PR MOST RECENT DIASTOLIC BLOOD PRESSURE >= 90 MM HG: ICD-10-PCS | Mod: CPTII,S$GLB,, | Performed by: INTERNAL MEDICINE

## 2022-02-10 PROCEDURE — 1159F PR MEDICATION LIST DOCUMENTED IN MEDICAL RECORD: ICD-10-PCS | Mod: CPTII,S$GLB,, | Performed by: INTERNAL MEDICINE

## 2022-02-10 PROCEDURE — 99214 PR OFFICE/OUTPT VISIT, EST, LEVL IV, 30-39 MIN: ICD-10-PCS | Mod: S$GLB,,, | Performed by: INTERNAL MEDICINE

## 2022-02-10 PROCEDURE — 4010F ACE/ARB THERAPY RXD/TAKEN: CPT | Mod: CPTII,S$GLB,, | Performed by: INTERNAL MEDICINE

## 2022-02-10 PROCEDURE — 4010F PR ACE/ARB THEARPY RXD/TAKEN: ICD-10-PCS | Mod: CPTII,S$GLB,, | Performed by: INTERNAL MEDICINE

## 2022-02-10 PROCEDURE — 3008F PR BODY MASS INDEX (BMI) DOCUMENTED: ICD-10-PCS | Mod: CPTII,S$GLB,, | Performed by: INTERNAL MEDICINE

## 2022-02-10 RX ORDER — SPIRONOLACTONE 25 MG/1
25 TABLET ORAL DAILY
Qty: 30 TABLET | Refills: 2 | Status: SHIPPED | OUTPATIENT
Start: 2022-02-10 | End: 2022-05-10

## 2022-02-10 NOTE — PATIENT INSTRUCTIONS
We were happy to see you today    For your Testing  Please have your labs and/or imaging test done  In 2 weeks     For your Medication   We started a new medication called aldactone  You can hold the carvedilol for now and see if the palpitation come back  For more information about side effects please visit medlineplus.gov        For your Vaccinations    We gave your the following vaccines  flu shot        Please return to clinic in        2 week nurse blood pressure check  3 month digital medicine

## 2022-02-10 NOTE — PROGRESS NOTES
"    Subjective:       Patient ID: Kenan Villalobos is a 57 y.o. male.    Chief Complaint:   Medication Refill      HPI          #Interim Hx    12/3/21 - uc - cold     #Concerns Today    Review labs and BP    #Chronic Problems      HTN  Dx; age 37  Complication: no CVA/CKD/CAD  Last labs :  BMP  Lab Results   Component Value Date     03/17/2021    K 3.6 03/17/2021     03/17/2021    CO2 29 03/17/2021    BUN 14 03/17/2021    CREATININE 1.2 03/17/2021    CALCIUM 9.0 03/17/2021    ANIONGAP 10 03/17/2021    ESTGFRAFRICA >60.0 03/17/2021    EGFRNONAA >60.0 03/17/2021       Negative stress 2013   Medication: adherent to   - carvediolol 3.125 mg bid REPORTS TAKING 1/2 TABLET  - losartan 100 mg daily  - chlorthalidone 25 mg daily  - STOPPED the hydralazine started by cardilology for "Sideeffect" of palpitaiton  - didn't tolerate amlodipne   Home Bps; has bP cuff at home. Not routinely checking   Symptoms: denies CP, SOB, changes in urination, sudden weakness, numbness        predm  Lab Results   Component Value Date    HGBA1C 5.6 02/07/2022         Health Maintenance Due   Topic Date Due    Shingles Vaccine (2 of 2) 03/06/2020    Influenza Vaccine (1) 09/01/2021    COVID-19 Vaccine (3 - Booster for Pfizer series) 10/20/2021       Health Maintenance Topics with due status: Not Due       Topic Last Completion Date    TETANUS VACCINE 01/22/2016    Colorectal Cancer Screening 07/11/2017    Lipid Panel 02/07/2022         Review of Systems   Constitutional: Negative for activity change, appetite change, fatigue and fever.   Respiratory: Negative for shortness of breath.    Cardiovascular: Positive for palpitations. Negative for chest pain.   Gastrointestinal: Negative for abdominal pain.   Genitourinary: Negative for difficulty urinating.   Musculoskeletal: Negative for neck pain.   Skin: Positive for color change.   Neurological: Negative for syncope and headaches.       Objective:   BP (!) 148/90 (BP Location: " "Right arm, Patient Position: Sitting, BP Method: Large (Manual))   Pulse 78   Ht 5' 11" (1.803 m)   Wt 100.6 kg (221 lb 12.5 oz)   SpO2 98%   BMI 30.93 kg/m²        Physical Exam  Vitals and nursing note reviewed.   Constitutional:       General: He is not in acute distress.     Appearance: He is well-developed. He is not ill-appearing, toxic-appearing or diaphoretic.   HENT:      Head: Normocephalic.   Eyes:      Conjunctiva/sclera: Conjunctivae normal.   Cardiovascular:      Rate and Rhythm: Normal rate and regular rhythm.      Heart sounds: Normal heart sounds. No murmur heard.  No friction rub. No gallop.    Pulmonary:      Effort: Pulmonary effort is normal. No respiratory distress.   Abdominal:      General: There is no distension.      Palpations: Abdomen is soft.   Neurological:      General: No focal deficit present.      Mental Status: He is alert and oriented to person, place, and time.             ASCVD  The 10-year ASCVD risk score (Jose Jdee CAAL Jr., et al., 2013) is: 14.4%    Values used to calculate the score:      Age: 57 years      Sex: Male      Is Non- : Yes      Diabetic: No      Tobacco smoker: No      Systolic Blood Pressure: 148 mmHg      Is BP treated: Yes      HDL Cholesterol: 43 mg/dL      Total Cholesterol: 178 mg/dL    Basic Labs  BMP  Lab Results   Component Value Date     03/17/2021    K 3.6 03/17/2021     03/17/2021    CO2 29 03/17/2021    BUN 14 03/17/2021    CREATININE 1.2 03/17/2021    CALCIUM 9.0 03/17/2021    ANIONGAP 10 03/17/2021    ESTGFRAFRICA >60.0 03/17/2021    EGFRNONAA >60.0 03/17/2021     Lab Results   Component Value Date    ALT 43 02/07/2022    AST 26 02/07/2022    ALKPHOS 47 (L) 02/07/2022    BILITOT 0.6 02/07/2022       Lab Results   Component Value Date    TSH 0.764 08/19/2020           STD  Lab Results   Component Value Date    OWY27NGVX Negative 01/22/2016     Lab Results   Component Value Date    RPR Non-reactive 01/22/2016 "         Lipids  Lab Results   Component Value Date    CHOL 178 02/07/2022     Lab Results   Component Value Date    HDL 43 02/07/2022     Lab Results   Component Value Date    LDLCALC 118.8 02/07/2022     Lab Results   Component Value Date    TRIG 81 02/07/2022     Lab Results   Component Value Date    CHOLHDL 24.2 02/07/2022       DM  Lab Results   Component Value Date    HGBA1C 5.6 02/07/2022       PSA  PSA, Screen (ng/mL)   Date Value   01/14/2020 0.73       Assessment:       1. Essential hypertension    2. Need for vaccination    3. Class 2 severe obesity due to excess calories with serious comorbidity in adult, unspecified BMI    4. Unspecified porphyria            Plan:             Kenan was seen today for medication refill.    Diagnoses and all orders for this visit:    Essential hypertension  Chronic  Uncontrolled  Patient is not at goal today  I have reviewed lifestyle modification to achieve/maintain goals  We will adjust the current medication regimen to   Patient will follow up in 2 weeks   WILL HOLD CARVEDIOLOL   START ALDACTONE  CHECK BMP IN 2 WEEKS  -     Basic Metabolic Panel; Future  -     spironolactone (ALDACTONE) 25 MG tablet; Take 1 tablet (25 mg total) by mouth once daily.    Need for vaccination  -- I reviewed the patient vaccine history and provided the risk benefits and side effects of the vaccine.   -- I provided the patient a VIS sheet on the vaccine.   -     Influenza - Quadrivalent (PF)    Class 2 severe obesity due to excess calories with serious comorbidity in adult, unspecified BMI    Unspecified porphyria  INCORRECT DX            Future Appointments   Date Time Provider Department Center   2/10/2022  2:20 PM MD ABBEY Moffett III  [place]   2/24/2022  9:15 AM LAB, YOAV KENH LAB [place]   2/24/2022  9:20 AM YOAV [facility]   5/10/2022 11:40 AM MD ABBEY [place] III  [place]           Medication List with Changes/Refills   Current  Medications    CARVEDILOL (COREG) 3.125 MG TABLET    carvedilol 3.125 mg tablet    CHLORTHALIDONE (HYGROTEN) 25 MG TAB    TAKE 1/2 TABLET(12.5 MG) BY MOUTH EVERY DAY    CLOTRIMAZOLE-BETAMETHASONE (LOTRISONE) LOTION    Apply topically 2 (two) times daily.    GABAPENTIN (NEURONTIN) 100 MG CAPSULE    Take 1 capsule (100 mg total) by mouth 3 (three) times daily as needed (facial tingling).    HYDRALAZINE (APRESOLINE) 50 MG TABLET    Take 1 tablet (50 mg total) by mouth 2 (two) times daily.    KETOCONAZOLE (NIZORAL) 2 % CREAM    ketoconazole 2 % topical cream   APPLY TOPICALLY AA QHS    KETOCONAZOLE (NIZORAL) 2 % SHAMPOO    ketoconazole 2 % shampoo   SHAMPOO ONCE DAILY AND LEAVE IT ON FOR 5 MINUTES AND RINSE. USE FOR 14 DAYS    LOSARTAN (COZAAR) 100 MG TABLET    TAKE 1 TABLET BY MOUTH DAILY    METHOCARBAMOL (ROBAXIN) 500 MG TAB    methocarbamol 500 mg tablet   TK 1 T PO TID WC FOR 3 TO 10 DAYS    NABUMETONE (RELAFEN) 750 MG TABLET    nabumetone 750 mg tablet    ROSUVASTATIN (CRESTOR) 5 MG TABLET    TAKE 1 TABLET(5 MG) BY MOUTH EVERY DAY         Disclaimer:  This note has been generated using voice-recognition software. There may be grammatical or spelling errors that have been missed during proof-reading

## 2022-02-15 ENCOUNTER — PATIENT MESSAGE (OUTPATIENT)
Dept: INTERNAL MEDICINE | Facility: CLINIC | Age: 57
End: 2022-02-15
Payer: COMMERCIAL

## 2022-04-27 DIAGNOSIS — I10 ESSENTIAL HYPERTENSION: ICD-10-CM

## 2022-04-27 RX ORDER — CHLORTHALIDONE 25 MG/1
TABLET ORAL
Qty: 45 TABLET | Refills: 1 | Status: SHIPPED | OUTPATIENT
Start: 2022-04-27 | End: 2022-11-10

## 2022-04-27 NOTE — TELEPHONE ENCOUNTER
Care Due:                  Date            Visit Type   Department     Provider  --------------------------------------------------------------------------------                                EP -                              PRIMARY      [facility] INTERNAL  Last Visit: 02-      CARE ([facility])   MEDICINE       [place]                              SAME DAY                              VIRTUAL      [facility] INTERNAL  Next Visit: 05-      VISIT        MEDICINE       Guy Sena                                                            Last  Test          Frequency    Reason                     Performed    Due Date  --------------------------------------------------------------------------------    CMP.........  12 months..  chlorthalidone, losartan,   08- 03-                             spironolactone...........    Powered by [email or web address]. Reference number: [id number].   4/27/2022 5:19:55 PM CDT

## 2022-04-28 NOTE — TELEPHONE ENCOUNTER
Refill Routing Note   Medication(s) are not appropriate for processing by [facility] for the following reason(s):      - Required vitals are abnormal    [facility] action(s):  Defer Medication-related problems identified: Requires labs        Medication reconciliation completed: No     Appointments  past 12m or future 3m with PCP    Date Provider   Last Visit   2/10/2022 Guy Sena III, MD   Next Visit   5/10/2022 Guy Sena III, MD   ED visits in past 90 days: 0        Note composed:9:14 PM 04/27/2022

## 2022-04-30 ENCOUNTER — TELEPHONE ENCOUNTER (OUTPATIENT)
Dept: URBAN - METROPOLITAN AREA CLINIC 121 | Facility: CLINIC | Age: 57
End: 2022-04-30

## 2022-05-01 ENCOUNTER — TELEPHONE ENCOUNTER (OUTPATIENT)
Dept: URBAN - METROPOLITAN AREA CLINIC 121 | Facility: CLINIC | Age: 57
End: 2022-05-01

## 2022-05-10 DIAGNOSIS — I10 ESSENTIAL HYPERTENSION: ICD-10-CM

## 2022-05-10 RX ORDER — SPIRONOLACTONE 25 MG/1
TABLET ORAL
Qty: 30 TABLET | Refills: 2 | Status: SHIPPED | OUTPATIENT
Start: 2022-05-10 | End: 2022-08-23

## 2022-05-10 NOTE — TELEPHONE ENCOUNTER
No new care gaps identified.  [facility] Embedded Care Gaps. Reference number: [id number]. 5/10/2022   9:47:05 AM JOANIET

## 2022-05-10 NOTE — TELEPHONE ENCOUNTER
Refill Routing Note   Medication(s) are not appropriate for processing by [facility] for the following reason(s):      - Required vitals are abnormal    [facility] action(s):  Defer       Medication Therapy Plan: Last BP 02/10/22 (!) 148/90  Medication reconciliation completed: No     Appointments  past 12m or future 3m with PCP    Date Provider   Last Visit   2/10/2022 Guy Sena III, MD   Next Visit   Visit date not found Guy Sena III, MD   ED visits in past 90 days: 0        Note composed:3:22 PM 05/10/2022

## 2022-08-23 ENCOUNTER — PATIENT MESSAGE (OUTPATIENT)
Dept: INTERNAL MEDICINE | Facility: CLINIC | Age: 57
End: 2022-08-23
Payer: COMMERCIAL

## 2022-08-23 DIAGNOSIS — I10 ESSENTIAL HYPERTENSION: ICD-10-CM

## 2022-08-23 RX ORDER — LOSARTAN POTASSIUM 100 MG/1
TABLET ORAL
Qty: 90 TABLET | OUTPATIENT
Start: 2022-08-23

## 2022-08-23 RX ORDER — LOSARTAN POTASSIUM 100 MG/1
TABLET ORAL
Qty: 30 TABLET | Refills: 0 | Status: SHIPPED | OUTPATIENT
Start: 2022-08-23 | End: 2022-11-21

## 2022-08-23 RX ORDER — SPIRONOLACTONE 25 MG/1
TABLET ORAL
Qty: 30 TABLET | Refills: 0 | Status: SHIPPED | OUTPATIENT
Start: 2022-08-23 | End: 2022-11-21

## 2022-08-23 RX ORDER — SPIRONOLACTONE 25 MG/1
TABLET ORAL
Qty: 90 TABLET | OUTPATIENT
Start: 2022-08-23

## 2022-08-23 NOTE — TELEPHONE ENCOUNTER
No new care gaps identified.  [facility] Embedded Care Gaps. Reference number: [id number]. 8/23/2022   12:09:18 PM CDT

## 2022-08-23 NOTE — TELEPHONE ENCOUNTER
Refill Decision Note   Kenan Wilfredo  is requesting a refill authorization.  Brief Assessment and Rationale for Refill:  Quick Discontinue     Medication Therapy Plan:       Medication Reconciliation Completed: No   Comments:     No Care Gaps recommended.     Note composed:12:15 PM 08/23/2022

## 2022-08-23 NOTE — TELEPHONE ENCOUNTER
Care Due:                  Date            Visit Type   Department     Provider  --------------------------------------------------------------------------------                                EP -                              PRIMARY      [facility] INTERNAL  Last Visit: 02-      [facility] ([facility])   MEDICINE       Guy Sena  Next Visit: None Scheduled  None         None Found                                                            Last  Test          Frequency    Reason                     Performed    Due Date  --------------------------------------------------------------------------------    CMP.........  12 months..  chlorthalidone, losartan,   Not Found    Overdue                             spironolactone...........    [employer] Embedded Care Gaps. Reference number: [id number]. 8/23/2022   9:15:52 AM CDT

## 2022-08-23 NOTE — TELEPHONE ENCOUNTER
Refill Routing Note   Medication(s) are not appropriate for processing by [facility] for the following reason(s):      - Required laboratory values are outdated  - Required vitals are abnormal    [facility] action(s):  Defer Medication-related problems identified: Requires labs        Medication reconciliation completed: No     Appointments  past 12m or future 3m with PCP    Date Provider   Last Visit   2/10/2022 Guy Sena III, MD   Next Visit   Visit date not found Guy Sena III, MD   ED visits in past 90 days: 0        Note composed:11:53 AM 08/23/2022

## 2022-08-26 ENCOUNTER — PATIENT MESSAGE (OUTPATIENT)
Dept: INTERNAL MEDICINE | Facility: CLINIC | Age: 57
End: 2022-08-26
Payer: COMMERCIAL

## 2022-11-09 NOTE — PROGRESS NOTES
Assessment:       1. Essential hypertension    2. Hyperlipidemia, unspecified hyperlipidemia type    3. Prediabetes    4. Screening for colon cancer    5. Encounter for prostate cancer screening    6. Dysphagia, unspecified type    7. Choking due to food in larynx, initial encounter          Plan:         Kenan was seen today for hypertension.    Diagnoses and all orders for this visit:    Essential hypertension  Chronic  Controlled  Patient is at goal today   I have reviewed lifestyle modification to achieve/maintain goals  We will continue the current medication regimen as listed below  Patient will follow up in 6 months   ADJUST MEDICATION  med TO REFLECT what he is actually taking  Taking just losartan and aldactone - seem to be controlled cont for now     -     Comprehensive Metabolic Panel; Future  -     Lipid Panel; Future  -     Basic Metabolic Panel; Standing  -     Hemoglobin A1C; Standing    Hyperlipidemia, unspecified hyperlipidemia type  -     Comprehensive Metabolic Panel; Future  -     Lipid Panel; Future  -     rosuvastatin (CRESTOR) 5 MG tablet; Take 1 tablet (5 mg total) by mouth once daily.    Prediabetes  -     Comprehensive Metabolic Panel; Future  -     Lipid Panel; Future  -     Hemoglobin A1C; Future  -     Basic Metabolic Panel; Standing  -     Hemoglobin A1C; Standing    Screening for colon cancer  -     Case Request Endoscopy: COLONOSCOPY    Encounter for prostate cancer screening  -     PSA, Screening; Future    Dysphagia, unspecified type  Unclear etiology but suspect esophageal ring  Lower suspecion for gi malginacy  Will set up for GI maybe EGD/COLON  -     Ambulatory referral/consult to Gastroenterology; Future    Choking due to food in larynx, initial encounter  -     Ambulatory referral/consult to Gastroenterology; Future        Subjective:       Patient ID: Kenan Villalobos is a 57 y.o. male.    Chief Complaint: No chief complaint on file.    Concerns today; reports  "that he previsouly saw GI doctor for chokening, told he had "abornmality" (susepct ring) cuaseing the choking, does not remember the diagnosis. He has had recent issues with food regurgitation, he is due for CRC screening     Hypertension  This is a chronic problem. The current episode started more than 1 year ago. The problem has been waxing and waning since onset. The problem is controlled. Treatments tried: RX LISITED INCLUDE CARDVEIOLOL, LOSARTAN, SPIRONOLACTONE, HYDRALAZINE . HE IS ONLY TAKING LOSRATAN AND ALDACTONE. The current treatment provides significant improvement. Compliance problems include psychosocial issues (DID NOT COME FOR FOLLOW UP).      Declined digital meidcine     Review of Systems   All other systems reviewed and are negative.          Health Maintenance Due   Topic Date Due    Shingles Vaccine (2 of 2) 03/06/2020    COVID-19 Vaccine (3 - Booster for Pfizer series) 06/15/2021    Colorectal Cancer Screening  07/11/2022    Influenza Vaccine (1) 09/01/2022   - Repeat colonoscopy in 5 years for screening   purposes.   Attending Participation:   I was present from insertion to withdraw and performed procedure   with the fellow.   Alejandro Seymour MD         Objective:     /87   Pulse 68     Vitals 2/10/2022 12/3/2021 6/23/2021 3/3/2021 2/3/2021   Height 71 71 71 71 70   Weight (lbs) 221.78 211 217 217.15 218.59   BMI (kg/m2) 30.9 29.4 30.3 30.3 31.4          Physical Exam  Vitals and nursing note reviewed.   Constitutional:       General: He is not in acute distress.     Appearance: He is well-developed. He is not ill-appearing, toxic-appearing or diaphoretic.   HENT:      Head: Normocephalic.   Eyes:      Conjunctiva/sclera: Conjunctivae normal.   Cardiovascular:      Rate and Rhythm: Normal rate and regular rhythm.      Heart sounds: Normal heart sounds. No murmur heard.    No friction rub. No gallop.   Pulmonary:      Effort: Pulmonary effort is normal. No respiratory distress.   Abdominal: "      General: There is no distension.      Palpations: Abdomen is soft.   Neurological:      General: No focal deficit present.      Mental Status: He is alert and oriented to person, place, and time.         PSA, Screen (ng/mL)   Date Value   01/14/2020 0.73       No future appointments.      Medication List with Changes/Refills   Current Medications    CLOTRIMAZOLE-BETAMETHASONE (LOTRISONE) LOTION    Apply topically 2 (two) times daily.    LOSARTAN (COZAAR) 100 MG TABLET    TAKE 1 TABLET BY MOUTH DAILY    SPIRONOLACTONE (ALDACTONE) 25 MG TABLET    TAKE 1 TABLET(25 MG) BY MOUTH EVERY DAY   Changed and/or Refilled Medications    Modified Medication Previous Medication    ROSUVASTATIN (CRESTOR) 5 MG TABLET rosuvastatin (CRESTOR) 5 MG tablet       Take 1 tablet (5 mg total) by mouth once daily.    TAKE 1 TABLET(5 MG) BY MOUTH EVERY DAY   Discontinued Medications    CARVEDILOL (COREG) 3.125 MG TABLET    carvedilol 3.125 mg tablet    CHLORTHALIDONE (HYGROTEN) 25 MG TAB    TAKE 1/2 TABLET(12.5 MG) BY MOUTH EVERY DAY    GABAPENTIN (NEURONTIN) 100 MG CAPSULE    Take 1 capsule (100 mg total) by mouth 3 (three) times daily as needed (facial tingling).    HYDRALAZINE (APRESOLINE) 50 MG TABLET    Take 1 tablet (50 mg total) by mouth 2 (two) times daily.    KETOCONAZOLE (NIZORAL) 2 % CREAM    ketoconazole 2 % topical cream   APPLY TOPICALLY AA QHS    KETOCONAZOLE (NIZORAL) 2 % SHAMPOO    ketoconazole 2 % shampoo   SHAMPOO ONCE DAILY AND LEAVE IT ON FOR 5 MINUTES AND RINSE. USE FOR 14 DAYS    METHOCARBAMOL (ROBAXIN) 500 MG TAB    methocarbamol 500 mg tablet   TK 1 T PO TID WC FOR 3 TO 10 DAYS    NABUMETONE (RELAFEN) 750 MG TABLET    nabumetone 750 mg tablet         Disclaimer:  This note has been generated using voice-recognition software. There may be grammatical or spelling errors that have been missed during proof-reading     The patient location is: [facility]   The chief complaint leading to consultation is: htn     Visit  type: audiovisual    Face to Face time with patient: 20   minutes of total time spent on the encounter, which includes face to face time and non-face to face time preparing to see the patient (eg, review of tests), Obtaining and/or reviewing separately obtained history, Documenting clinical information in the electronic or other health record, Independently interpreting results (not separately reported) and communicating results to the patient/family/caregiver, or Care coordination (not separately reported).         Each patient to whom he or she provides medical services by telemedicine is:  (1) informed of the relationship between the physician and patient and the respective role of any other health care provider with respect to management of the patient; and (2) notified that he or she may decline to receive medical services by telemedicine and may withdraw from such care at any time.    Notes:

## 2022-11-10 ENCOUNTER — TELEPHONE (OUTPATIENT)
Dept: GASTROENTEROLOGY | Facility: CLINIC | Age: 57
End: 2022-11-10
Payer: COMMERCIAL

## 2022-11-10 ENCOUNTER — OFFICE VISIT (OUTPATIENT)
Dept: INTERNAL MEDICINE | Facility: CLINIC | Age: 57
End: 2022-11-10
Payer: COMMERCIAL

## 2022-11-10 VITALS — DIASTOLIC BLOOD PRESSURE: 87 MMHG | HEART RATE: 68 BPM | SYSTOLIC BLOOD PRESSURE: 124 MMHG

## 2022-11-10 DIAGNOSIS — E78.5 HYPERLIPIDEMIA, UNSPECIFIED HYPERLIPIDEMIA TYPE: ICD-10-CM
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
## 2022-11-10 DIAGNOSIS — I10 ESSENTIAL HYPERTENSION: Primary | ICD-10-CM

## 2022-11-10 DIAGNOSIS — R13.10 DYSPHAGIA, UNSPECIFIED TYPE: ICD-10-CM

## 2022-11-10 DIAGNOSIS — T17.320A CHOKING DUE TO FOOD IN LARYNX, INITIAL ENCOUNTER: ICD-10-CM

## 2022-11-10 DIAGNOSIS — R73.03 PREDIABETES: ICD-10-CM

## 2022-11-10 DIAGNOSIS — Z12.5 ENCOUNTER FOR PROSTATE CANCER SCREENING: ICD-10-CM

## 2022-11-10 DIAGNOSIS — Z12.11 SCREENING FOR COLON CANCER: ICD-10-CM

## 2022-11-10 PROCEDURE — 3074F SYST BP LT 130 MM HG: CPT | Mod: CPTII,95,, | Performed by: INTERNAL MEDICINE

## 2022-11-10 PROCEDURE — 3044F PR MOST RECENT HEMOGLOBIN A1C LEVEL <7.0%: ICD-10-PCS | Mod: CPTII,95,, | Performed by: INTERNAL MEDICINE

## 2022-11-10 PROCEDURE — 1159F MED LIST DOCD IN RCRD: CPT | Mod: CPTII,95,, | Performed by: INTERNAL MEDICINE

## 2022-11-10 PROCEDURE — 3074F PR MOST RECENT SYSTOLIC BLOOD PRESSURE < 130 MM HG: ICD-10-PCS | Mod: CPTII,95,, | Performed by: INTERNAL MEDICINE

## 2022-11-10 PROCEDURE — 3044F HG A1C LEVEL LT 7.0%: CPT | Mod: CPTII,95,, | Performed by: INTERNAL MEDICINE

## 2022-11-10 PROCEDURE — 1159F PR MEDICATION LIST DOCUMENTED IN MEDICAL RECORD: ICD-10-PCS | Mod: CPTII,95,, | Performed by: INTERNAL MEDICINE

## 2022-11-10 PROCEDURE — 4010F PR ACE/ARB THEARPY RXD/TAKEN: ICD-10-PCS | Mod: CPTII,95,, | Performed by: INTERNAL MEDICINE

## 2022-11-10 PROCEDURE — 3079F PR MOST RECENT DIASTOLIC BLOOD PRESSURE 80-89 MM HG: ICD-10-PCS | Mod: CPTII,95,, | Performed by: INTERNAL MEDICINE

## 2022-11-10 PROCEDURE — 1160F RVW MEDS BY RX/DR IN RCRD: CPT | Mod: CPTII,95,, | Performed by: INTERNAL MEDICINE

## 2022-11-10 PROCEDURE — 4010F ACE/ARB THERAPY RXD/TAKEN: CPT | Mod: CPTII,95,, | Performed by: INTERNAL MEDICINE

## 2022-11-10 PROCEDURE — 99214 PR OFFICE/OUTPT VISIT, EST, LEVL IV, 30-39 MIN: ICD-10-PCS | Mod: 95,,, | Performed by: INTERNAL MEDICINE

## 2022-11-10 PROCEDURE — 99214 OFFICE O/P EST MOD 30 MIN: CPT | Mod: 95,,, | Performed by: INTERNAL MEDICINE

## 2022-11-10 PROCEDURE — 1160F PR REVIEW ALL MEDS BY PRESCRIBER/CLIN PHARMACIST DOCUMENTED: ICD-10-PCS | Mod: CPTII,95,, | Performed by: INTERNAL MEDICINE

## 2022-11-10 PROCEDURE — 3079F DIAST BP 80-89 MM HG: CPT | Mod: CPTII,95,, | Performed by: INTERNAL MEDICINE

## 2022-11-10 RX ORDER — ROSUVASTATIN CALCIUM 5 MG/1
5 TABLET, COATED ORAL DAILY
Qty: 90 TABLET | Refills: 1 | Status: SHIPPED | OUTPATIENT
Start: 2022-11-10 | End: 2023-03-19 | Stop reason: SDUPTHER

## 2022-11-10 NOTE — TELEPHONE ENCOUNTER
----- Message from Charleen Berkowitz sent at 11/10/2022  1:43 PM CST -----  Good afternoon,    The patient has a referral from his primary doctor with a diagnosis of Dysphagia, unspecified type/Choking due to food in larynx, initial encounter. Please assist with scheduling the patient?    Thank you

## 2022-11-11 ENCOUNTER — LAB VISIT (OUTPATIENT)
Dept: LAB | Facility: HOSPITAL | Age: 57
End: 2022-11-11
Attending: INTERNAL MEDICINE
Payer: COMMERCIAL

## 2022-11-11 DIAGNOSIS — E78.5 HYPERLIPIDEMIA, UNSPECIFIED HYPERLIPIDEMIA TYPE: ICD-10-CM

## 2022-11-11 DIAGNOSIS — Z12.5 ENCOUNTER FOR PROSTATE CANCER SCREENING: ICD-10-CM

## 2022-11-11 DIAGNOSIS — I10 ESSENTIAL HYPERTENSION: ICD-10-CM

## 2022-11-11 DIAGNOSIS — R73.03 PREDIABETES: ICD-10-CM

## 2022-11-11 LAB
ALBUMIN SERPL BCP-MCNC: 3.9 G/DL (ref 3.5–5.2)
ALP SERPL-CCNC: 44 U/L (ref 55–135)
ALT SERPL W/O P-5'-P-CCNC: 33 U/L (ref 10–44)
ANION GAP SERPL CALC-SCNC: 9 MMOL/L (ref 8–16)
ANION GAP SERPL CALC-SCNC: 9 MMOL/L (ref 8–16)
AST SERPL-CCNC: 21 U/L (ref 10–40)
BILIRUB SERPL-MCNC: 0.8 MG/DL (ref 0.1–1)
BUN SERPL-MCNC: 14 MG/DL (ref 6–20)
BUN SERPL-MCNC: 14 MG/DL (ref 6–20)
CALCIUM SERPL-MCNC: 9.9 MG/DL (ref 8.7–10.5)
CALCIUM SERPL-MCNC: 9.9 MG/DL (ref 8.7–10.5)
CHLORIDE SERPL-SCNC: 105 MMOL/L (ref 95–110)
CHLORIDE SERPL-SCNC: 105 MMOL/L (ref 95–110)
CHOLEST SERPL-MCNC: 235 MG/DL (ref 120–199)
CHOLEST/HDLC SERPL: 5.1 {RATIO} (ref 2–5)
CO2 SERPL-SCNC: 24 MMOL/L (ref 23–29)
CO2 SERPL-SCNC: 24 MMOL/L (ref 23–29)
COMPLEXED PSA SERPL-MCNC: 0.9 NG/ML (ref 0–4)
CREAT SERPL-MCNC: 1.1 MG/DL (ref 0.5–1.4)
CREAT SERPL-MCNC: 1.1 MG/DL (ref 0.5–1.4)
EST. GFR  (NO RACE VARIABLE): >60 ML/MIN/1.73 M^2
EST. GFR  (NO RACE VARIABLE): >60 ML/MIN/1.73 M^2
ESTIMATED AVG GLUCOSE: 111 MG/DL (ref 68–131)
GLUCOSE SERPL-MCNC: 92 MG/DL (ref 70–110)
GLUCOSE SERPL-MCNC: 92 MG/DL (ref 70–110)
HBA1C MFR BLD: 5.5 % (ref 4–5.6)
HDLC SERPL-MCNC: 46 MG/DL (ref 40–75)
HDLC SERPL: 19.6 % (ref 20–50)
LDLC SERPL CALC-MCNC: 173.4 MG/DL (ref 63–159)
NONHDLC SERPL-MCNC: 189 MG/DL
POTASSIUM SERPL-SCNC: 4.9 MMOL/L (ref 3.5–5.1)
POTASSIUM SERPL-SCNC: 4.9 MMOL/L (ref 3.5–5.1)
PROT SERPL-MCNC: 7.7 G/DL (ref 6–8.4)
SODIUM SERPL-SCNC: 138 MMOL/L (ref 136–145)
SODIUM SERPL-SCNC: 138 MMOL/L (ref 136–145)
TRIGL SERPL-MCNC: 78 MG/DL (ref 30–150)

## 2022-11-11 PROCEDURE — 83036 HEMOGLOBIN GLYCOSYLATED A1C: CPT | Performed by: INTERNAL MEDICINE

## 2022-11-11 PROCEDURE — 36415 COLL VENOUS BLD VENIPUNCTURE: CPT | Mod: PO | Performed by: INTERNAL MEDICINE

## 2022-11-11 PROCEDURE — 80053 COMPREHEN METABOLIC PANEL: CPT | Performed by: INTERNAL MEDICINE

## 2022-11-11 PROCEDURE — 80061 LIPID PANEL: CPT | Performed by: INTERNAL MEDICINE

## 2022-11-11 PROCEDURE — 84153 ASSAY OF PSA TOTAL: CPT | Performed by: INTERNAL MEDICINE

## 2022-11-20 ENCOUNTER — PATIENT MESSAGE (OUTPATIENT)
Dept: INTERNAL MEDICINE | Facility: CLINIC | Age: 57
End: 2022-11-20
Payer: COMMERCIAL

## 2022-12-02 ENCOUNTER — OFFICE VISIT (OUTPATIENT)
Dept: OPTOMETRY | Facility: CLINIC | Age: 57
End: 2022-12-02
Payer: COMMERCIAL

## 2022-12-02 DIAGNOSIS — H40.023 OAG (OPEN ANGLE GLAUCOMA) SUSPECT, HIGH RISK, BILATERAL: Primary | ICD-10-CM

## 2022-12-02 DIAGNOSIS — H52.13 MYOPIA OF BOTH EYES WITH ASTIGMATISM AND PRESBYOPIA: ICD-10-CM

## 2022-12-02 DIAGNOSIS — H52.203 MYOPIA OF BOTH EYES WITH ASTIGMATISM AND PRESBYOPIA: ICD-10-CM

## 2022-12-02 DIAGNOSIS — H52.4 MYOPIA OF BOTH EYES WITH ASTIGMATISM AND PRESBYOPIA: ICD-10-CM

## 2022-12-02 PROCEDURE — 1160F PR REVIEW ALL MEDS BY PRESCRIBER/CLIN PHARMACIST DOCUMENTED: ICD-10-PCS | Mod: CPTII,S$GLB,, | Performed by: OPTOMETRIST

## 2022-12-02 PROCEDURE — 1159F MED LIST DOCD IN RCRD: CPT | Mod: CPTII,S$GLB,, | Performed by: OPTOMETRIST

## 2022-12-02 PROCEDURE — 4010F ACE/ARB THERAPY RXD/TAKEN: CPT | Mod: CPTII,S$GLB,, | Performed by: OPTOMETRIST

## 2022-12-02 PROCEDURE — 99999 PR PBB SHADOW E&M-EST. PATIENT-LVL III: CPT | Mod: PBBFAC,,, | Performed by: OPTOMETRIST

## 2022-12-02 PROCEDURE — 92004 COMPRE OPH EXAM NEW PT 1/>: CPT | Mod: S$GLB,,, | Performed by: OPTOMETRIST

## 2022-12-02 PROCEDURE — 3044F HG A1C LEVEL LT 7.0%: CPT | Mod: CPTII,S$GLB,, | Performed by: OPTOMETRIST

## 2022-12-02 PROCEDURE — 92004 PR EYE EXAM, NEW PATIENT,COMPREHESV: ICD-10-PCS | Mod: S$GLB,,, | Performed by: OPTOMETRIST

## 2022-12-02 PROCEDURE — 3044F PR MOST RECENT HEMOGLOBIN A1C LEVEL <7.0%: ICD-10-PCS | Mod: CPTII,S$GLB,, | Performed by: OPTOMETRIST

## 2022-12-02 PROCEDURE — 1159F PR MEDICATION LIST DOCUMENTED IN MEDICAL RECORD: ICD-10-PCS | Mod: CPTII,S$GLB,, | Performed by: OPTOMETRIST

## 2022-12-02 PROCEDURE — 92015 DETERMINE REFRACTIVE STATE: CPT | Mod: S$GLB,,, | Performed by: OPTOMETRIST

## 2022-12-02 PROCEDURE — 99999 PR PBB SHADOW E&M-EST. PATIENT-LVL III: ICD-10-PCS | Mod: PBBFAC,,, | Performed by: OPTOMETRIST

## 2022-12-02 PROCEDURE — 4010F PR ACE/ARB THEARPY RXD/TAKEN: ICD-10-PCS | Mod: CPTII,S$GLB,, | Performed by: OPTOMETRIST

## 2022-12-02 PROCEDURE — 92015 PR REFRACTION: ICD-10-PCS | Mod: S$GLB,,, | Performed by: OPTOMETRIST

## 2022-12-02 PROCEDURE — 1160F RVW MEDS BY RX/DR IN RCRD: CPT | Mod: CPTII,S$GLB,, | Performed by: OPTOMETRIST

## 2022-12-02 NOTE — PROGRESS NOTES
HPI    Glaucoma suspect/Ocular health exam   DLS-2019 Dr. Palacios a few weeks ago at [facility] but did not have   a good experience     Pt sts distance vision has started to decline does not need to use glasses   for near.   Wearing 3 year old glasses PAL's wears them full time but sometimes will   take them off depending on what he is looking at.  Denies pain     (-)Flashes (-)Floaters  (-)Itch, (-)tear, (-)burn, (-)Dryness.  (-) OTC Drops  (-)Photophobia  (-)Glare    No past eye sx's     FamOhx : Glaucoma - Mother   Last edited by Yane Royal on 12/2/2022  3:05 PM.            Assessment /Plan     For exam results, see Encounter Report.      OAG (open angle glaucoma) suspect, high risk, bilateral  -     Posterior Segment OCT Optic Nerve- Both eyes; Future  -     Thompson Visual Field - OU - Extended - Both Eyes; Future  (+)FHx-mom. IOP 10 OD, 12 OS. C/d 0.75 OD, OS. Pachy 492 OD, 513 OS. Educated pt on findings w/understanding. RTC 1 mo IOP/OCT/24-2 Norma faster HVF.     Myopia of both eyes with astigmatism and presbyopia  SRx released to patient. Patient educated on lens options. Normal ocular health. RTC 1 year for routine exam.

## 2022-12-06 ENCOUNTER — OFFICE VISIT (OUTPATIENT)
Dept: GASTROENTEROLOGY | Facility: CLINIC | Age: 57
End: 2022-12-06
Payer: COMMERCIAL

## 2022-12-06 ENCOUNTER — PATIENT MESSAGE (OUTPATIENT)
Dept: PRIMARY CARE CLINIC | Facility: CLINIC | Age: 57
End: 2022-12-06
Payer: COMMERCIAL

## 2022-12-06 VITALS — BODY MASS INDEX: 29.91 KG/M2 | WEIGHT: 213.63 LBS | HEIGHT: 71 IN

## 2022-12-06 DIAGNOSIS — R10.13 DYSPEPSIA: Primary | ICD-10-CM

## 2022-12-06 DIAGNOSIS — Z12.11 COLON CANCER SCREENING: ICD-10-CM

## 2022-12-06 PROCEDURE — 1159F PR MEDICATION LIST DOCUMENTED IN MEDICAL RECORD: ICD-10-PCS | Mod: CPTII,S$GLB,, | Performed by: NURSE PRACTITIONER

## 2022-12-06 PROCEDURE — 99999 PR PBB SHADOW E&M-EST. PATIENT-LVL III: ICD-10-PCS | Mod: PBBFAC,,, | Performed by: NURSE PRACTITIONER

## 2022-12-06 PROCEDURE — 3008F BODY MASS INDEX DOCD: CPT | Mod: CPTII,S$GLB,, | Performed by: NURSE PRACTITIONER

## 2022-12-06 PROCEDURE — 4010F ACE/ARB THERAPY RXD/TAKEN: CPT | Mod: CPTII,S$GLB,, | Performed by: NURSE PRACTITIONER

## 2022-12-06 PROCEDURE — 99204 PR OFFICE/OUTPT VISIT, NEW, LEVL IV, 45-59 MIN: ICD-10-PCS | Mod: S$GLB,,, | Performed by: NURSE PRACTITIONER

## 2022-12-06 PROCEDURE — 99999 PR PBB SHADOW E&M-EST. PATIENT-LVL III: CPT | Mod: PBBFAC,,, | Performed by: NURSE PRACTITIONER

## 2022-12-06 PROCEDURE — 3044F PR MOST RECENT HEMOGLOBIN A1C LEVEL <7.0%: ICD-10-PCS | Mod: CPTII,S$GLB,, | Performed by: NURSE PRACTITIONER

## 2022-12-06 PROCEDURE — 3008F PR BODY MASS INDEX (BMI) DOCUMENTED: ICD-10-PCS | Mod: CPTII,S$GLB,, | Performed by: NURSE PRACTITIONER

## 2022-12-06 PROCEDURE — 3044F HG A1C LEVEL LT 7.0%: CPT | Mod: CPTII,S$GLB,, | Performed by: NURSE PRACTITIONER

## 2022-12-06 PROCEDURE — 4010F PR ACE/ARB THEARPY RXD/TAKEN: ICD-10-PCS | Mod: CPTII,S$GLB,, | Performed by: NURSE PRACTITIONER

## 2022-12-06 PROCEDURE — 99204 OFFICE O/P NEW MOD 45 MIN: CPT | Mod: S$GLB,,, | Performed by: NURSE PRACTITIONER

## 2022-12-06 PROCEDURE — 1159F MED LIST DOCD IN RCRD: CPT | Mod: CPTII,S$GLB,, | Performed by: NURSE PRACTITIONER

## 2022-12-06 RX ORDER — PANTOPRAZOLE SODIUM 40 MG/1
40 TABLET, DELAYED RELEASE ORAL DAILY
Qty: 30 TABLET | Refills: 11 | Status: SHIPPED | OUTPATIENT
Start: 2022-12-06 | End: 2023-09-14 | Stop reason: SDUPTHER

## 2022-12-06 NOTE — PATIENT INSTRUCTIONS
Start pantoprazole daily. Take 30 minutes before first meal of the day.     You can try Lactaid over the counter. Take before eating dairy products.

## 2022-12-06 NOTE — PROGRESS NOTES
GASTROENTEROLOGY CLINIC NOTE    Chief Complaint: The primary encounter diagnosis was Dyspepsia. A diagnosis of Colon cancer screening was also pertinent to this visit.  Referring provider/PCP: Ry Sena III, MD    Kenan Villalobos is a 57 y.o. male who is a new patient to me with a PMH of GERD.  He is here today to establish care for dyspepsia.  Reports h/o reflux about 20 years ago. EGD performed and normal per patient's recollection.  Placed on medication at that time and underwent diet and lifestyle changes which helped symptoms.  Eventually stopped medication.  No problems until few years ago. Describes feeling of food sitting in the epigastrium area and taking awhile to digest.  Accompanied by belching and some regurgitation.  Able to regurgitate food if he is feeling uncomfortable. Describes more of discomfort/uneasiness rather than abdominal pain.  Worse with heavier foods.  Pyrosis only with known food triggers such as spaghetti.  Denies dysphagia, odynophagia, water brash, early satiety, unexplained weight loss, nausea, vomiting, changes in bowel habits, melena, or hematochezia.  Has not tried anything for symptoms. No significant NSAID, ETOH, or caffeine use.     NSAIDs: No  Anticoagulation or Antiplatelet: No    History of H.pylori: no  H.pylori Treatment:  Prior Upper Endoscopy: about 20 years ago; no abnormal findings per patient    Prior Colonoscopy: 7/2017 with Dr. Seymour  Findings:        The perianal and digital rectal examinations were normal.        The entire examined colon appeared normal.     Impression:           - The entire examined colon is normal.                         - No specimens collected.     Recommendation:       - Discharge patient to home (ambulatory).                         - Repeat colonoscopy in 5 years for screening                         purposes.     Family h/o Colon Cancer: No  Family h/o Crohn's Disease or Ulcerative Colitis: No  Family h/o Celiac Sprue:  No  Abdominal Surgeries: no      Review of Systems   Constitutional:  Negative for weight loss.   HENT:  Negative for sore throat.    Eyes:  Negative for blurred vision.   Respiratory:  Negative for cough.    Cardiovascular:  Negative for chest pain.   Gastrointestinal:  Negative for abdominal pain, blood in stool, constipation, diarrhea, heartburn, melena, nausea and vomiting.   Genitourinary:  Negative for dysuria.   Musculoskeletal:  Negative for myalgias.   Skin:  Negative for rash.   Neurological:  Negative for headaches.   Endo/Heme/Allergies:  Negative for environmental allergies.   Psychiatric/Behavioral:  Negative for suicidal ideas. The patient is not nervous/anxious.      Past Medical History: has a past medical history of GERD with esophagitis, Hemorrhoid, and Hypertension.    Past Surgical History: has a past surgical history that includes Colonoscopy (N/A, 7/11/2017).    Family History:family history includes Cancer in his father; Glaucoma in his mother; Hypertension in his mother; Prostate cancer in his paternal uncle, paternal uncle, paternal uncle, and paternal uncle.    Allergies:   Review of patient's allergies indicates:   Allergen Reactions    Amlodipine Edema     Ankle edema       Social History: reports that he has never smoked. He has never used smokeless tobacco. He reports current alcohol use. He reports that he does not use drugs.    Home medications:   Current Outpatient Medications on File Prior to Visit   Medication Sig Dispense Refill    clotrimazole-betamethasone (LOTRISONE) lotion Apply topically 2 (two) times daily. 30 mL 0    losartan (COZAAR) 100 MG tablet TAKE 1 TABLET BY MOUTH DAILY 90 tablet 0    rosuvastatin (CRESTOR) 5 MG tablet Take 1 tablet (5 mg total) by mouth once daily. 90 tablet 1    spironolactone (ALDACTONE) 25 MG tablet Take 1 tablet (25 mg total) by mouth once daily. 30 tablet 0     No current facility-administered medications on file prior to visit.       Vital  "signs:  Ht 5' 11" (1.803 m)   Wt 96.9 kg (213 lb 10 oz)   BMI 29.79 kg/m²     Physical Exam  Vitals reviewed.   Constitutional:       General: He is not in acute distress.     Appearance: Normal appearance. He is not ill-appearing.   HENT:      Head: Normocephalic.   Cardiovascular:      Rate and Rhythm: Normal rate and regular rhythm.      Heart sounds: Normal heart sounds. No murmur heard.  Pulmonary:      Effort: Pulmonary effort is normal. No respiratory distress.      Breath sounds: Normal breath sounds.   Chest:      Chest wall: No tenderness.   Abdominal:      General: Bowel sounds are normal. There is no distension.      Palpations: Abdomen is soft.      Tenderness: There is no abdominal tenderness. Negative signs include Hernandez's sign.      Hernia: No hernia is present.   Skin:     General: Skin is warm.   Neurological:      Mental Status: He is alert and oriented to person, place, and time.   Psychiatric:         Mood and Affect: Mood normal.         Behavior: Behavior normal.       Routine labs:  Lab Results   Component Value Date    WBC 5.16 08/19/2020    HGB 15.4 08/19/2020    HCT 45.3 08/19/2020    MCV 90 08/19/2020     08/19/2020     No results found for: INR  No results found for: IRON, FERRITIN, TIBC, FESATURATED  Lab Results   Component Value Date     11/11/2022     11/11/2022    K 4.9 11/11/2022    K 4.9 11/11/2022     11/11/2022     11/11/2022    CO2 24 11/11/2022    CO2 24 11/11/2022    BUN 14 11/11/2022    BUN 14 11/11/2022    CREATININE 1.1 11/11/2022    CREATININE 1.1 11/11/2022     Lab Results   Component Value Date    ALBUMIN 3.9 11/11/2022    ALT 33 11/11/2022    AST 21 11/11/2022    ALKPHOS 44 (L) 11/11/2022    BILITOT 0.8 11/11/2022     No results found for: GLUCOSE  Lab Results   Component Value Date    TSH 0.764 08/19/2020     Lab Results   Component Value Date    CALCIUM 9.9 11/11/2022    CALCIUM 9.9 11/11/2022       Imaging:        I have reviewed " prior labs, imaging, and notes.      Assessment:  1. Dyspepsia    2. Colon cancer screening        Plan:  Orders Placed This Encounter    pantoprazole (PROTONIX) 40 MG tablet     Protonix 40mg daily. Take 30 minutes before first meal of the day.   Recommend EGD.   Colonoscopy for colon cancer screening. Suprep for bowel prep to be ordered when patient scheduled.     Recommend EGD and colonoscopy. EGD to further evaluate symptoms to r/o narrowing of esophagus, hiatal hernia, gastritis, H.pylori.  Colonoscopy for colon cancer screening. Last colonoscopy 2017 with 5 year recall. Patient would not like to schedule procedures at this time. He would like to trial Protonix first and schedule colonoscopy after the beginning of the year. Consider adding EGD at that time. Case request entered.   Will trial Protonix for symptoms.     Plan of care discussed with patient who is in agreement and verbalized understanding.     I have explained the planned procedures to the patient.The risks, benefits and alternatives of the procedure were also explained in detail. Patient verbalized understanding, all questions were answered. The patient agrees to proceed as planned    Follow Up: As Needed          Romy Nava, DOLORES,FNP-BC  [facility] [place]    (Portions of this note were dictated using voice recognition software and may contain dictation related errors in spelling/grammar/syntax not found on text review)

## 2022-12-08 ENCOUNTER — TELEPHONE (OUTPATIENT)
Dept: FAMILY MEDICINE | Facility: CLINIC | Age: 57
End: 2022-12-08
Payer: COMMERCIAL

## 2022-12-08 NOTE — TELEPHONE ENCOUNTER
Called patient and scheduled a follow up appt. for labs per Dr. Sena. Patient understood of this appt.

## 2022-12-21 ENCOUNTER — TELEPHONE (OUTPATIENT)
Dept: ENDOSCOPY | Facility: HOSPITAL | Age: 57
End: 2022-12-21
Payer: COMMERCIAL

## 2022-12-21 NOTE — TELEPHONE ENCOUNTER
Kenan Villalobos declined to schedule procedure at this time states feels better will let PCP know when he is ready to schedule

## 2023-01-10 ENCOUNTER — OFFICE VISIT (OUTPATIENT)
Dept: INTERNAL MEDICINE | Facility: CLINIC | Age: 58
End: 2023-01-10
Payer: COMMERCIAL

## 2023-01-10 VITALS — DIASTOLIC BLOOD PRESSURE: 83 MMHG | WEIGHT: 210 LBS | SYSTOLIC BLOOD PRESSURE: 133 MMHG | BODY MASS INDEX: 29.29 KG/M2

## 2023-01-10 DIAGNOSIS — E78.5 HYPERLIPIDEMIA, UNSPECIFIED HYPERLIPIDEMIA TYPE: ICD-10-CM

## 2023-01-10 DIAGNOSIS — R73.03 PREDIABETES: ICD-10-CM

## 2023-01-10 DIAGNOSIS — E66.01 CLASS 2 SEVERE OBESITY DUE TO EXCESS CALORIES WITH SERIOUS COMORBIDITY IN ADULT, UNSPECIFIED BMI: ICD-10-CM

## 2023-01-10 DIAGNOSIS — I10 ESSENTIAL HYPERTENSION: Primary | ICD-10-CM

## 2023-01-10 DIAGNOSIS — Z12.11 SCREENING FOR COLON CANCER: ICD-10-CM

## 2023-01-10 PROCEDURE — 1160F RVW MEDS BY RX/DR IN RCRD: CPT | Mod: CPTII,95,, | Performed by: INTERNAL MEDICINE

## 2023-01-10 PROCEDURE — 3075F SYST BP GE 130 - 139MM HG: CPT | Mod: CPTII,95,, | Performed by: INTERNAL MEDICINE

## 2023-01-10 PROCEDURE — 3075F PR MOST RECENT SYSTOLIC BLOOD PRESS GE 130-139MM HG: ICD-10-PCS | Mod: CPTII,95,, | Performed by: INTERNAL MEDICINE

## 2023-01-10 PROCEDURE — 1160F PR REVIEW ALL MEDS BY PRESCRIBER/CLIN PHARMACIST DOCUMENTED: ICD-10-PCS | Mod: CPTII,95,, | Performed by: INTERNAL MEDICINE

## 2023-01-10 PROCEDURE — 3008F PR BODY MASS INDEX (BMI) DOCUMENTED: ICD-10-PCS | Mod: CPTII,95,, | Performed by: INTERNAL MEDICINE

## 2023-01-10 PROCEDURE — 99213 PR OFFICE/OUTPT VISIT, EST, LEVL III, 20-29 MIN: ICD-10-PCS | Mod: 95,,, | Performed by: INTERNAL MEDICINE

## 2023-01-10 PROCEDURE — 3079F DIAST BP 80-89 MM HG: CPT | Mod: CPTII,95,, | Performed by: INTERNAL MEDICINE

## 2023-01-10 PROCEDURE — 3008F BODY MASS INDEX DOCD: CPT | Mod: CPTII,95,, | Performed by: INTERNAL MEDICINE

## 2023-01-10 PROCEDURE — 99213 OFFICE O/P EST LOW 20 MIN: CPT | Mod: 95,,, | Performed by: INTERNAL MEDICINE

## 2023-01-10 PROCEDURE — 1159F PR MEDICATION LIST DOCUMENTED IN MEDICAL RECORD: ICD-10-PCS | Mod: CPTII,95,, | Performed by: INTERNAL MEDICINE

## 2023-01-10 PROCEDURE — 3079F PR MOST RECENT DIASTOLIC BLOOD PRESSURE 80-89 MM HG: ICD-10-PCS | Mod: CPTII,95,, | Performed by: INTERNAL MEDICINE

## 2023-01-10 PROCEDURE — 1159F MED LIST DOCD IN RCRD: CPT | Mod: CPTII,95,, | Performed by: INTERNAL MEDICINE

## 2023-01-10 RX ORDER — SPIRONOLACTONE 25 MG/1
25 TABLET ORAL DAILY
Qty: 90 TABLET | Refills: 1 | Status: SHIPPED | OUTPATIENT
Start: 2023-01-10 | End: 2023-09-14 | Stop reason: SDUPTHER

## 2023-01-10 NOTE — PROGRESS NOTES
The patient location is: [place]   The chief complaint leading to consultation is: htn,     Visit type: audiovisual    Face to Face time with patient: 20   minutes of total time spent on the encounter, which includes face to face time and non-face to face time preparing to see the patient (eg, review of tests), Obtaining and/or reviewing separately obtained history, Documenting clinical information in the electronic or other health record, Independently interpreting results (not separately reported) and communicating results to the patient/family/caregiver, or Care coordination (not separately reported).         Each patient to whom he or she provides medical services by telemedicine is:  (1) informed of the relationship between the physician and patient and the respective role of any other health care provider with respect to management of the patient; and (2) notified that he or she may decline to receive medical services by telemedicine and may withdraw from such care at any time.    Notes:     Assessment:       1. Hyperlipidemia, unspecified hyperlipidemia type    2. Class 2 severe obesity due to excess calories with serious comorbidity in adult, unspecified BMI    3. Essential hypertension    4. Prediabetes    5. Screening for colon cancer          Plan:         Diagnoses and all orders for this visit:    Essential hypertension  Chronic  Controlled  Patient is at goal today   I have reviewed lifestyle modification to achieve/maintain goals  We will continue the current medication regimen as listed below  Patient will follow up in 6 months   -     spironolactone (ALDACTONE) 25 MG tablet; Take 1 tablet (25 mg total) by mouth once daily.  -     Basic Metabolic Panel; Standing  -     Hemoglobin A1C; Standing  -     Hepatic Function Panel; Standing  -     Lipid Panel; Standing    Class 2 severe obesity due to excess calories with serious comorbidity in adult, unspecified BMI    Hyperlipidemia, unspecified  hyperlipidemia type  -     Lipid Panel; Standing  -     Basic Metabolic Panel; Standing  -     Hemoglobin A1C; Standing  -     Hepatic Function Panel; Standing  -     Lipid Panel; Standing    Prediabetes  -     Basic Metabolic Panel; Standing  -     Hemoglobin A1C; Standing  -     Hepatic Function Panel; Standing  -     Lipid Panel; Standing    Screening for colon cancer  -     Case Request Endoscopy: COLONOSCOPY      Will take crestor daily has been since 11/2022 recheck  in 4 weeks and ADJUST ACCORDINGLY  6 month fu with prelabs         Subjective:       Patient ID: Kenan Villalobos is a 57 y.o. male.    Chief Complaint: No chief complaint on file.    Starting back on 11/2022 on the statin     Hypertension  Pertinent negatives include no chest pain, headaches, neck pain or palpitations. Past treatments include angiotensin blockers (MRA). The current treatment provides significant improvement. There are no compliance problems.      Review of Systems   Constitutional:  Negative for activity change and unexpected weight change.   HENT:  Negative for hearing loss, rhinorrhea and trouble swallowing.    Eyes:  Negative for discharge and visual disturbance.   Respiratory:  Negative for chest tightness and wheezing.    Cardiovascular:  Negative for chest pain and palpitations.   Gastrointestinal:  Negative for blood in stool, constipation, diarrhea and vomiting.   Endocrine: Negative for polydipsia and polyuria.   Genitourinary:  Negative for difficulty urinating, hematuria and urgency.   Musculoskeletal:  Negative for arthralgias, joint swelling and neck pain.   Neurological:  Negative for weakness and headaches.   Psychiatric/Behavioral:  Negative for confusion and dysphoric mood.            Health Maintenance Due   Topic Date Due    Shingles Vaccine (2 of 2) 03/06/2020    COVID-19 Vaccine (4 - Booster for Pfizer series) 07/05/2022    Colorectal Cancer Screening  07/11/2022    Influenza Vaccine (1) 09/01/2022        Repeat colonoscopy in 5 years for screening   purposes.   Attending Participation:   I was present from insertion to withdraw and performed procedure   with the fellow.   Alejandro Seymour MD      Objective:     /83   Wt 95.3 kg (210 lb)   BMI 29.29 kg/m²     Vitals 1/10/2023 12/6/2022 2/10/2022 12/3/2021 6/23/2021   Height - 71 71 71 71   Weight (lbs) 210 213.63 221.78 211 217   BMI (kg/m2) - 29.8 30.9 29.4 30.3            Physical Exam  Nursing note reviewed.   Constitutional:       General: He is not in acute distress.     Appearance: Normal appearance. He is not ill-appearing, toxic-appearing or diaphoretic.   HENT:      Head: Normocephalic.   Eyes:      Conjunctiva/sclera: Conjunctivae normal.   Pulmonary:      Effort: Pulmonary effort is normal. No respiratory distress.   Neurological:      General: No focal deficit present.      Mental Status: He is alert and oriented to person, place, and time.   Psychiatric:         Mood and Affect: Mood normal.         Behavior: Behavior normal.         Thought Content: Thought content normal.         Judgment: Judgment normal.         Results for orders placed or performed in visit on 11/11/22   Basic Metabolic Panel   Result Value Ref Range    Sodium 138 136 - 145 mmol/L    Potassium 4.9 3.5 - 5.1 mmol/L    Chloride 105 95 - 110 mmol/L    CO2 24 23 - 29 mmol/L    Glucose 92 70 - 110 mg/dL    BUN 14 6 - 20 mg/dL    Creatinine 1.1 0.5 - 1.4 mg/dL    Calcium 9.9 8.7 - 10.5 mg/dL    Anion Gap 9 8 - 16 mmol/L    eGFR >60.0 >60 mL/min/1.73 m^2   Comprehensive Metabolic Panel   Result Value Ref Range    Sodium 138 136 - 145 mmol/L    Potassium 4.9 3.5 - 5.1 mmol/L    Chloride 105 95 - 110 mmol/L    CO2 24 23 - 29 mmol/L    Glucose 92 70 - 110 mg/dL    BUN 14 6 - 20 mg/dL    Creatinine 1.1 0.5 - 1.4 mg/dL    Calcium 9.9 8.7 - 10.5 mg/dL    Total Protein 7.7 6.0 - 8.4 g/dL    Albumin 3.9 3.5 - 5.2 g/dL    Total Bilirubin 0.8 0.1 - 1.0 mg/dL    Alkaline Phosphatase 44 (L)  55 - 135 U/L    AST 21 10 - 40 U/L    ALT 33 10 - 44 U/L    Anion Gap 9 8 - 16 mmol/L    eGFR >60.0 >60 mL/min/1.73 m^2   Lipid Panel   Result Value Ref Range    Cholesterol 235 (H) 120 - 199 mg/dL    Triglycerides 78 30 - 150 mg/dL    HDL 46 40 - 75 mg/dL    LDL Cholesterol 173.4 (H) 63.0 - 159.0 mg/dL    HDL/Cholesterol Ratio 19.6 (L) 20.0 - 50.0 %    Total Cholesterol/HDL Ratio 5.1 (H) 2.0 - 5.0    Non-HDL Cholesterol 189 mg/dL   Hemoglobin A1C   Result Value Ref Range    Hemoglobin A1C 5.5 4.0 - 5.6 %    Estimated Avg Glucose 111 68 - 131 mg/dL   PSA, Screening   Result Value Ref Range    PSA, Screen 0.90 0.00 - 4.00 ng/mL         Future Appointments   Date Time Provider Department Center   1/30/2023 10:45 AM [place] OPHTHAL [place]   1/30/2023  1:00 PM Kaila Merrill OD [facility] OPTOMTY [place]   4/13/2023 10:00 AM Kenan Reyes MD [facility]   5/25/2023  8:40 AM Guy Sena III, MD [facility]         Medication List with Changes/Refills   Current Medications    CLOTRIMAZOLE-BETAMETHASONE (LOTRISONE) LOTION    Apply topically 2 (two) times daily.    LOSARTAN (COZAAR) 100 MG TABLET    Take 1 tablet (100 mg total) by mouth once daily.    PANTOPRAZOLE (PROTONIX) 40 MG TABLET    Take 1 tablet (40 mg total) by mouth once daily.    ROSUVASTATIN (CRESTOR) 5 MG TABLET    Take 1 tablet (5 mg total) by mouth once daily.    SPIRONOLACTONE (ALDACTONE) 25 MG TABLET    Take 1 tablet (25 mg total) by mouth once daily.         Disclaimer:  This note has been generated using voice-recognition software. There may be grammatical or spelling errors that have been missed during proof-reading

## 2023-01-30 ENCOUNTER — CLINICAL SUPPORT (OUTPATIENT)
Dept: OPHTHALMOLOGY | Facility: CLINIC | Age: 58
End: 2023-01-30
Payer: COMMERCIAL

## 2023-01-30 ENCOUNTER — OFFICE VISIT (OUTPATIENT)
Dept: OPTOMETRY | Facility: CLINIC | Age: 58
End: 2023-01-30
Payer: COMMERCIAL

## 2023-01-30 DIAGNOSIS — H40.1131 PRIMARY OPEN ANGLE GLAUCOMA (POAG) OF BOTH EYES, MILD STAGE: Primary | ICD-10-CM

## 2023-01-30 DIAGNOSIS — H40.023 OAG (OPEN ANGLE GLAUCOMA) SUSPECT, HIGH RISK, BILATERAL: ICD-10-CM

## 2023-01-30 PROCEDURE — 99214 OFFICE O/P EST MOD 30 MIN: CPT | Mod: S$GLB,,, | Performed by: OPTOMETRIST

## 2023-01-30 PROCEDURE — 99999 PR PBB SHADOW E&M-EST. PATIENT-LVL II: ICD-10-PCS | Mod: PBBFAC,,, | Performed by: OPTOMETRIST

## 2023-01-30 PROCEDURE — 1160F RVW MEDS BY RX/DR IN RCRD: CPT | Mod: CPTII,S$GLB,, | Performed by: OPTOMETRIST

## 2023-01-30 PROCEDURE — 1159F MED LIST DOCD IN RCRD: CPT | Mod: CPTII,S$GLB,, | Performed by: OPTOMETRIST

## 2023-01-30 PROCEDURE — 99999 PR PBB SHADOW E&M-EST. PATIENT-LVL II: CPT | Mod: PBBFAC,,, | Performed by: OPTOMETRIST

## 2023-01-30 PROCEDURE — 1160F PR REVIEW ALL MEDS BY PRESCRIBER/CLIN PHARMACIST DOCUMENTED: ICD-10-PCS | Mod: CPTII,S$GLB,, | Performed by: OPTOMETRIST

## 2023-01-30 PROCEDURE — 1159F PR MEDICATION LIST DOCUMENTED IN MEDICAL RECORD: ICD-10-PCS | Mod: CPTII,S$GLB,, | Performed by: OPTOMETRIST

## 2023-01-30 PROCEDURE — 99214 PR OFFICE/OUTPT VISIT, EST, LEVL IV, 30-39 MIN: ICD-10-PCS | Mod: S$GLB,,, | Performed by: OPTOMETRIST

## 2023-01-30 NOTE — PROGRESS NOTES
HVF done ou. Rel/fix/coop. Good ou./chart checked for latex allergy./ -2.75 +v 1.25 x11/od -4.00+ 1.50 x 170/os

## 2023-02-07 ENCOUNTER — LAB VISIT (OUTPATIENT)
Dept: LAB | Facility: HOSPITAL | Age: 58
End: 2023-02-07
Attending: INTERNAL MEDICINE
Payer: COMMERCIAL

## 2023-02-07 DIAGNOSIS — R73.03 PREDIABETES: ICD-10-CM

## 2023-02-07 DIAGNOSIS — I10 ESSENTIAL HYPERTENSION: ICD-10-CM

## 2023-02-07 DIAGNOSIS — E78.5 HYPERLIPIDEMIA, UNSPECIFIED HYPERLIPIDEMIA TYPE: ICD-10-CM

## 2023-02-07 LAB
CHOLEST SERPL-MCNC: 162 MG/DL (ref 120–199)
CHOLEST/HDLC SERPL: 4.5 {RATIO} (ref 2–5)
HDLC SERPL-MCNC: 36 MG/DL (ref 40–75)
HDLC SERPL: 22.2 % (ref 20–50)
LDLC SERPL CALC-MCNC: 112.2 MG/DL (ref 63–159)
NONHDLC SERPL-MCNC: 126 MG/DL
TRIGL SERPL-MCNC: 69 MG/DL (ref 30–150)

## 2023-02-07 PROCEDURE — 80061 LIPID PANEL: CPT | Performed by: INTERNAL MEDICINE

## 2023-02-07 PROCEDURE — 36415 COLL VENOUS BLD VENIPUNCTURE: CPT | Mod: PO | Performed by: INTERNAL MEDICINE

## 2023-04-03 ENCOUNTER — TELEPHONE (OUTPATIENT)
Dept: GASTROENTEROLOGY | Facility: CLINIC | Age: 58
End: 2023-04-03
Payer: COMMERCIAL

## 2023-04-03 NOTE — TELEPHONE ENCOUNTER
RE: GI Colonoscopy Referral Call     Spoke with patient regarding scheduling colonoscopy procedure. Call outcome: Patient will call back to schedule. He is currently driving and working. Clinic number provided. Call time: 3 minutes

## 2023-07-03 ENCOUNTER — TELEPHONE (OUTPATIENT)
Dept: GASTROENTEROLOGY | Facility: CLINIC | Age: 58
End: 2023-07-03
Payer: COMMERCIAL

## 2023-07-05 ENCOUNTER — LAB VISIT (OUTPATIENT)
Dept: LAB | Facility: HOSPITAL | Age: 58
End: 2023-07-05
Attending: INTERNAL MEDICINE
Payer: COMMERCIAL

## 2023-07-05 DIAGNOSIS — R73.03 PREDIABETES: ICD-10-CM

## 2023-07-05 DIAGNOSIS — E78.5 HYPERLIPIDEMIA, UNSPECIFIED HYPERLIPIDEMIA TYPE: ICD-10-CM

## 2023-07-05 DIAGNOSIS — I10 ESSENTIAL HYPERTENSION: ICD-10-CM

## 2023-07-05 LAB
ALBUMIN SERPL BCP-MCNC: 3.9 G/DL (ref 3.5–5.2)
ALP SERPL-CCNC: 48 U/L (ref 55–135)
ALT SERPL W/O P-5'-P-CCNC: 42 U/L (ref 10–44)
ANION GAP SERPL CALC-SCNC: 9 MMOL/L (ref 8–16)
AST SERPL-CCNC: 26 U/L (ref 10–40)
BILIRUB DIRECT SERPL-MCNC: 0.2 MG/DL (ref 0.1–0.3)
BILIRUB SERPL-MCNC: 0.7 MG/DL (ref 0.1–1)
BUN SERPL-MCNC: 14 MG/DL (ref 6–20)
CALCIUM SERPL-MCNC: 10 MG/DL (ref 8.7–10.5)
CHLORIDE SERPL-SCNC: 102 MMOL/L (ref 95–110)
CHOLEST SERPL-MCNC: 184 MG/DL (ref 120–199)
CHOLEST/HDLC SERPL: 4 {RATIO} (ref 2–5)
CO2 SERPL-SCNC: 28 MMOL/L (ref 23–29)
CREAT SERPL-MCNC: 1.2 MG/DL (ref 0.5–1.4)
EST. GFR  (NO RACE VARIABLE): >60 ML/MIN/1.73 M^2
ESTIMATED AVG GLUCOSE: 111 MG/DL (ref 68–131)
GLUCOSE SERPL-MCNC: 90 MG/DL (ref 70–110)
HBA1C MFR BLD: 5.5 % (ref 4–5.6)
HDLC SERPL-MCNC: 46 MG/DL (ref 40–75)
HDLC SERPL: 25 % (ref 20–50)
LDLC SERPL CALC-MCNC: 126.4 MG/DL (ref 63–159)
NONHDLC SERPL-MCNC: 138 MG/DL
POTASSIUM SERPL-SCNC: 4.1 MMOL/L (ref 3.5–5.1)
PROT SERPL-MCNC: 7.4 G/DL (ref 6–8.4)
SODIUM SERPL-SCNC: 139 MMOL/L (ref 136–145)
TRIGL SERPL-MCNC: 58 MG/DL (ref 30–150)

## 2023-07-05 PROCEDURE — 36415 COLL VENOUS BLD VENIPUNCTURE: CPT | Mod: PO | Performed by: INTERNAL MEDICINE

## 2023-07-05 PROCEDURE — 80061 LIPID PANEL: CPT | Performed by: INTERNAL MEDICINE

## 2023-07-05 PROCEDURE — 83036 HEMOGLOBIN GLYCOSYLATED A1C: CPT | Performed by: INTERNAL MEDICINE

## 2023-07-05 PROCEDURE — 80076 HEPATIC FUNCTION PANEL: CPT | Performed by: INTERNAL MEDICINE

## 2023-07-05 PROCEDURE — 80048 BASIC METABOLIC PNL TOTAL CA: CPT | Performed by: INTERNAL MEDICINE

## 2023-07-12 ENCOUNTER — PATIENT MESSAGE (OUTPATIENT)
Dept: INTERNAL MEDICINE | Facility: CLINIC | Age: 58
End: 2023-07-12

## 2023-07-12 ENCOUNTER — OFFICE VISIT (OUTPATIENT)
Dept: INTERNAL MEDICINE | Facility: CLINIC | Age: 58
End: 2023-07-12
Payer: COMMERCIAL

## 2023-07-12 VITALS
BODY MASS INDEX: 29.26 KG/M2 | HEIGHT: 71 IN | WEIGHT: 209 LBS | DIASTOLIC BLOOD PRESSURE: 82 MMHG | HEART RATE: 85 BPM | SYSTOLIC BLOOD PRESSURE: 124 MMHG | OXYGEN SATURATION: 99 %

## 2023-07-12 DIAGNOSIS — I10 ESSENTIAL HYPERTENSION: Primary | ICD-10-CM

## 2023-07-12 DIAGNOSIS — R73.03 PREDIABETES: ICD-10-CM

## 2023-07-12 PROCEDURE — 3008F BODY MASS INDEX DOCD: CPT | Mod: CPTII,S$GLB,, | Performed by: INTERNAL MEDICINE

## 2023-07-12 PROCEDURE — 1160F PR REVIEW ALL MEDS BY PRESCRIBER/CLIN PHARMACIST DOCUMENTED: ICD-10-PCS | Mod: CPTII,S$GLB,, | Performed by: INTERNAL MEDICINE

## 2023-07-12 PROCEDURE — 3074F PR MOST RECENT SYSTOLIC BLOOD PRESSURE < 130 MM HG: ICD-10-PCS | Mod: CPTII,S$GLB,, | Performed by: INTERNAL MEDICINE

## 2023-07-12 PROCEDURE — 1160F RVW MEDS BY RX/DR IN RCRD: CPT | Mod: CPTII,S$GLB,, | Performed by: INTERNAL MEDICINE

## 2023-07-12 PROCEDURE — 3074F SYST BP LT 130 MM HG: CPT | Mod: CPTII,S$GLB,, | Performed by: INTERNAL MEDICINE

## 2023-07-12 PROCEDURE — 3079F DIAST BP 80-89 MM HG: CPT | Mod: CPTII,S$GLB,, | Performed by: INTERNAL MEDICINE

## 2023-07-12 PROCEDURE — 99999 PR PBB SHADOW E&M-EST. PATIENT-LVL III: CPT | Mod: PBBFAC,,, | Performed by: INTERNAL MEDICINE

## 2023-07-12 PROCEDURE — 3008F PR BODY MASS INDEX (BMI) DOCUMENTED: ICD-10-PCS | Mod: CPTII,S$GLB,, | Performed by: INTERNAL MEDICINE

## 2023-07-12 PROCEDURE — 1159F PR MEDICATION LIST DOCUMENTED IN MEDICAL RECORD: ICD-10-PCS | Mod: CPTII,S$GLB,, | Performed by: INTERNAL MEDICINE

## 2023-07-12 PROCEDURE — 4010F PR ACE/ARB THEARPY RXD/TAKEN: ICD-10-PCS | Mod: CPTII,S$GLB,, | Performed by: INTERNAL MEDICINE

## 2023-07-12 PROCEDURE — 3044F HG A1C LEVEL LT 7.0%: CPT | Mod: CPTII,S$GLB,, | Performed by: INTERNAL MEDICINE

## 2023-07-12 PROCEDURE — 99214 PR OFFICE/OUTPT VISIT, EST, LEVL IV, 30-39 MIN: ICD-10-PCS | Mod: S$GLB,,, | Performed by: INTERNAL MEDICINE

## 2023-07-12 PROCEDURE — 4010F ACE/ARB THERAPY RXD/TAKEN: CPT | Mod: CPTII,S$GLB,, | Performed by: INTERNAL MEDICINE

## 2023-07-12 PROCEDURE — 3079F PR MOST RECENT DIASTOLIC BLOOD PRESSURE 80-89 MM HG: ICD-10-PCS | Mod: CPTII,S$GLB,, | Performed by: INTERNAL MEDICINE

## 2023-07-12 PROCEDURE — 3044F PR MOST RECENT HEMOGLOBIN A1C LEVEL <7.0%: ICD-10-PCS | Mod: CPTII,S$GLB,, | Performed by: INTERNAL MEDICINE

## 2023-07-12 PROCEDURE — 99214 OFFICE O/P EST MOD 30 MIN: CPT | Mod: S$GLB,,, | Performed by: INTERNAL MEDICINE

## 2023-07-12 PROCEDURE — 1159F MED LIST DOCD IN RCRD: CPT | Mod: CPTII,S$GLB,, | Performed by: INTERNAL MEDICINE

## 2023-07-12 PROCEDURE — 99999 PR PBB SHADOW E&M-EST. PATIENT-LVL III: ICD-10-PCS | Mod: PBBFAC,,, | Performed by: INTERNAL MEDICINE

## 2023-07-12 NOTE — PROGRESS NOTES
"Assessment:       1. Essential hypertension    2. Prediabetes        Plan:         Kenan was seen today for hypertension and results.    Diagnoses and all orders for this visit:    Essential hypertension  Chronic  Controlled  Patient is at goal today   I have reviewed lifestyle modification to achieve/maintain goals  We will continue the current medication regimen as listed below  Patient will follow up in 6 months   -     Basic Metabolic Panel; Standing  -     Lipid Panel; Standing  -     PSA, Screening; Standing    Prediabetes  -     Basic Metabolic Panel; Standing  -     Lipid Panel; Standing  -     PSA, Screening; Standing          Subjective:       Patient ID: Kenan Villalobos is a 58 y.o. male.    Chief Complaint: Hypertension and Results        Interim Hx  Last seen by me in January 2023    Concerns today  None    Chronic problems     Hypertension  This is a chronic problem. The current episode started more than 1 year ago. The problem has been waxing and waning since onset. The problem is controlled. Past treatments include ACE inhibitors. The current treatment provides significant improvement. There are no compliance problems.      Review of Systems   All other systems reviewed and are negative.          Health Maintenance Due   Topic Date Due    Shingles Vaccine (2 of 2) 03/06/2020    COVID-19 Vaccine (4 - Pfizer series) 07/05/2022    Colorectal Cancer Screening  07/11/2022         Objective:     /82 (BP Location: Right arm, Patient Position: Sitting, BP Method: Large (Manual))   Pulse 85   Ht 5' 11" (1.803 m)   Wt 94.8 kg (208 lb 15.9 oz)   SpO2 99%   BMI 29.15 kg/m²     Vitals 7/12/2023 1/10/2023 12/6/2022 2/10/2022 12/3/2021   Height 71 - 71 71 71   Weight (lbs) 209 210 213.63 221.78 211   BMI (kg/m2) 29.2 - 29.8 30.9 29.4              Physical Exam  Vitals and nursing note reviewed.   Constitutional:       General: He is not in acute distress.     Appearance: He is well-developed. He is " not ill-appearing, toxic-appearing or diaphoretic.   HENT:      Head: Normocephalic.   Eyes:      Conjunctiva/sclera: Conjunctivae normal.   Cardiovascular:      Rate and Rhythm: Normal rate and regular rhythm.      Heart sounds: Normal heart sounds. No murmur heard.    No friction rub. No gallop.   Pulmonary:      Effort: Pulmonary effort is normal. No respiratory distress.   Abdominal:      General: There is no distension.      Palpations: Abdomen is soft.   Neurological:      General: No focal deficit present.      Mental Status: He is alert and oriented to person, place, and time.           Future Appointments   Date Time Provider Department Center   7/18/2023  1:00 PM Kaila Merrill, OD [facility] [place]           Medication List with Changes/Refills   Current Medications    CLOTRIMAZOLE-BETAMETHASONE (LOTRISONE) LOTION    Apply topically 2 (two) times daily.    LOSARTAN (COZAAR) 100 MG TABLET    Take 1 tablet (100 mg total) by mouth once daily.    PANTOPRAZOLE (PROTONIX) 40 MG TABLET    Take 1 tablet (40 mg total) by mouth once daily.    ROSUVASTATIN (CRESTOR) 5 MG TABLET    Take 1 tablet (5 mg total) by mouth once daily.    SPIRONOLACTONE (ALDACTONE) 25 MG TABLET    Take 1 tablet (25 mg total) by mouth once daily.         Disclaimer:  This note has been generated using voice-recognition software. There may be grammatical or spelling errors that have been missed during proof-reading

## 2023-08-16 ENCOUNTER — TELEPHONE (OUTPATIENT)
Dept: ENDOSCOPY | Facility: HOSPITAL | Age: 58
End: 2023-08-16
Payer: COMMERCIAL

## 2023-08-16 NOTE — TELEPHONE ENCOUNTER
Spoke with patient about arrival time @ 1115.   Colon    Prep instructions reviewed: the day before the procedure, follow a clear liquid diet all day, then start the first 1/2 of prep at 5pm and take 2nd 1/2 of prep @ 0600.  Pt must be completely NPO when prep completed @ 0815.              Medications: Do not take Insulin or oral diabetic medications the day of the procedure.  Take as prescribed: heart, seizure and blood pressure medication in the morning with a sip of water (less than an ounce).  Take any breathing medications and bring inhalers to hospital with you Leave all valuables and jewelry at home.     Wear comfortable clothes to procedure to change into hospital gown You cannot drive for 24 hours after your procedure because you will receive sedation for your procedure to make you comfortable.  A ride must be provided at discharge.

## 2023-08-17 ENCOUNTER — PATIENT MESSAGE (OUTPATIENT)
Dept: ENDOSCOPY | Facility: HOSPITAL | Age: 58
End: 2023-08-17
Payer: COMMERCIAL

## 2023-09-03 DIAGNOSIS — E78.5 HYPERLIPIDEMIA, UNSPECIFIED HYPERLIPIDEMIA TYPE: ICD-10-CM

## 2023-09-03 NOTE — TELEPHONE ENCOUNTER
No care due was identified.  [facility] Embedded Care Due Messages. Reference number: [id number].   9/03/2023 11:50:30 AM CDT

## 2023-09-05 DIAGNOSIS — E78.5 HYPERLIPIDEMIA, UNSPECIFIED HYPERLIPIDEMIA TYPE: ICD-10-CM

## 2023-09-05 RX ORDER — ROSUVASTATIN CALCIUM 5 MG/1
5 TABLET, COATED ORAL DAILY
Qty: 90 TABLET | Refills: 2 | OUTPATIENT
Start: 2023-09-05

## 2023-09-05 RX ORDER — ROSUVASTATIN CALCIUM 5 MG/1
5 TABLET, COATED ORAL DAILY
Qty: 90 TABLET | Refills: 3 | Status: SHIPPED | OUTPATIENT
Start: 2023-09-05 | End: 2023-11-26 | Stop reason: SDUPTHER

## 2023-09-05 NOTE — TELEPHONE ENCOUNTER
Refill Decision Note   Kenan Villalobos  is requesting a refill authorization.  Brief Assessment and Rationale for Refill:  Quick Discontinue     Medication Therapy Plan:  Duplicate      Comments:     Note composed:8:36 AM 09/05/2023

## 2023-09-05 NOTE — TELEPHONE ENCOUNTER
No care due was identified.  [facility] Embedded Care Due Messages. Reference number: [id number].   9/05/2023 7:20:04 AM CDT

## 2023-09-05 NOTE — TELEPHONE ENCOUNTER
Refill Decision Note   Kenan Villalobos  is requesting a refill authorization.  Brief Assessment and Rationale for Refill:  Approve     Medication Therapy Plan:         Comments:     Note composed:8:40 AM 09/05/2023

## 2023-09-12 ENCOUNTER — PATIENT MESSAGE (OUTPATIENT)
Dept: INTERNAL MEDICINE | Facility: CLINIC | Age: 58
End: 2023-09-12
Payer: COMMERCIAL

## 2023-09-15 RX ORDER — PANTOPRAZOLE SODIUM 40 MG/1
40 TABLET, DELAYED RELEASE ORAL DAILY
Qty: 30 TABLET | Refills: 11 | Status: SHIPPED | OUTPATIENT
Start: 2023-09-15 | End: 2023-11-26 | Stop reason: SDUPTHER

## 2023-10-03 ENCOUNTER — TELEPHONE (OUTPATIENT)
Dept: GASTROENTEROLOGY | Facility: CLINIC | Age: 58
End: 2023-10-03
Payer: COMMERCIAL

## 2023-10-03 NOTE — TELEPHONE ENCOUNTER
Spoke with patient and scheduled procedure on 11/02. Instructions sent via portal. Patient states that he already has prep. Verbal understanding

## 2023-10-31 ENCOUNTER — TELEPHONE (OUTPATIENT)
Dept: ENDOSCOPY | Facility: HOSPITAL | Age: 58
End: 2023-10-31
Payer: COMMERCIAL

## 2023-10-31 NOTE — TELEPHONE ENCOUNTER
Spoke with patient about arrival time @ 0915.   Colon/Suprep    Prep instructions reviewed: the day before the procedure, follow a clear liquid diet all day, then start the first 1/2 of prep at 5pm and take 2nd 1/2 of prep @ 0400.  Pt must be completely NPO when prep completed @ 0600.               Medications: Do not take Insulin or oral diabetic medications the day of the procedure.  Take as prescribed: heart, seizure and blood pressure medication in the morning with a sip of water (less than an ounce).  Take any breathing medications and bring inhalers to hospital with you Leave all valuables and jewelry at home.     Wear comfortable clothes to procedure to change into hospital gown You cannot drive for 24 hours after your procedure because you will receive sedation for your procedure to make you comfortable.  A ride must be provided at discharge.

## 2023-11-02 ENCOUNTER — ANESTHESIA EVENT (OUTPATIENT)
Dept: ENDOSCOPY | Facility: HOSPITAL | Age: 58
End: 2023-11-02
Payer: COMMERCIAL

## 2023-11-02 ENCOUNTER — ANESTHESIA (OUTPATIENT)
Dept: ENDOSCOPY | Facility: HOSPITAL | Age: 58
End: 2023-11-02
Payer: COMMERCIAL

## 2023-11-02 ENCOUNTER — HOSPITAL ENCOUNTER (OUTPATIENT)
Facility: HOSPITAL | Age: 58
Discharge: HOME | End: 2023-11-02
Attending: INTERNAL MEDICINE | Admitting: INTERNAL MEDICINE
Payer: COMMERCIAL

## 2023-11-02 VITALS
WEIGHT: 209 LBS | DIASTOLIC BLOOD PRESSURE: 91 MMHG | HEIGHT: 71 IN | SYSTOLIC BLOOD PRESSURE: 139 MMHG | OXYGEN SATURATION: 99 % | HEART RATE: 74 BPM | BODY MASS INDEX: 29.26 KG/M2 | TEMPERATURE: 98 F | RESPIRATION RATE: 18 BRPM

## 2023-11-02 DIAGNOSIS — Z12.11 COLON CANCER SCREENING: ICD-10-CM

## 2023-11-02 PROCEDURE — 45385 PR COLONOSCOPY,REMV LESN,SNARE: ICD-10-PCS | Mod: 33,,, | Performed by: INTERNAL MEDICINE

## 2023-11-02 PROCEDURE — 27201089 HC SNARE, DISP (ANY): Performed by: INTERNAL MEDICINE

## 2023-11-02 PROCEDURE — 45385 COLONOSCOPY W/LESION REMOVAL: CPT | Mod: PT | Performed by: INTERNAL MEDICINE

## 2023-11-02 PROCEDURE — 88305 TISSUE EXAM BY PATHOLOGIST: ICD-10-PCS | Mod: 26,,, | Performed by: STUDENT IN AN ORGANIZED HEALTH CARE EDUCATION/TRAINING PROGRAM

## 2023-11-02 PROCEDURE — 45385 COLONOSCOPY W/LESION REMOVAL: CPT | Mod: 33,,, | Performed by: INTERNAL MEDICINE

## 2023-11-02 PROCEDURE — D9220A PRA ANESTHESIA: ICD-10-PCS | Mod: 33,ANES,, | Performed by: ANESTHESIOLOGY

## 2023-11-02 PROCEDURE — 25000003 PHARM REV CODE 250: Performed by: INTERNAL MEDICINE

## 2023-11-02 PROCEDURE — 88305 TISSUE EXAM BY PATHOLOGIST: CPT | Performed by: STUDENT IN AN ORGANIZED HEALTH CARE EDUCATION/TRAINING PROGRAM

## 2023-11-02 PROCEDURE — D9220A PRA ANESTHESIA: Mod: 33,ANES,, | Performed by: ANESTHESIOLOGY

## 2023-11-02 PROCEDURE — D9220A PRA ANESTHESIA: Mod: 33,CRNA,, | Performed by: NURSE ANESTHETIST, CERTIFIED REGISTERED

## 2023-11-02 PROCEDURE — 37000009 HC ANESTHESIA EA ADD 15 MINS: Performed by: INTERNAL MEDICINE

## 2023-11-02 PROCEDURE — 88305 TISSUE EXAM BY PATHOLOGIST: CPT | Mod: 26,,, | Performed by: STUDENT IN AN ORGANIZED HEALTH CARE EDUCATION/TRAINING PROGRAM

## 2023-11-02 PROCEDURE — 25000003 PHARM REV CODE 250: Performed by: NURSE ANESTHETIST, CERTIFIED REGISTERED

## 2023-11-02 PROCEDURE — 63600175 PHARM REV CODE 636 W HCPCS: Performed by: NURSE ANESTHETIST, CERTIFIED REGISTERED

## 2023-11-02 PROCEDURE — 37000008 HC ANESTHESIA 1ST 15 MINUTES: Performed by: INTERNAL MEDICINE

## 2023-11-02 PROCEDURE — D9220A PRA ANESTHESIA: ICD-10-PCS | Mod: 33,CRNA,, | Performed by: NURSE ANESTHETIST, CERTIFIED REGISTERED

## 2023-11-02 RX ORDER — LIDOCAINE HYDROCHLORIDE 20 MG/ML
INJECTION INTRAVENOUS
Status: DISCONTINUED | OUTPATIENT
Start: 2023-11-02 | End: 2023-11-02

## 2023-11-02 RX ORDER — SODIUM CHLORIDE 9 MG/ML
INJECTION, SOLUTION INTRAVENOUS CONTINUOUS
Status: DISCONTINUED | OUTPATIENT
Start: 2023-11-02 | End: 2023-11-02 | Stop reason: HOSPADM

## 2023-11-02 RX ORDER — PROPOFOL 10 MG/ML
VIAL (ML) INTRAVENOUS
Status: DISCONTINUED | OUTPATIENT
Start: 2023-11-02 | End: 2023-11-02

## 2023-11-02 RX ORDER — PROPOFOL 10 MG/ML
VIAL (ML) INTRAVENOUS CONTINUOUS PRN
Status: DISCONTINUED | OUTPATIENT
Start: 2023-11-02 | End: 2023-11-02

## 2023-11-02 RX ORDER — SODIUM CHLORIDE 0.9 % (FLUSH) 0.9 %
10 SYRINGE (ML) INJECTION
Status: DISCONTINUED | OUTPATIENT
Start: 2023-11-02 | End: 2023-11-02 | Stop reason: HOSPADM

## 2023-11-02 RX ADMIN — SODIUM CHLORIDE: 9 INJECTION, SOLUTION INTRAVENOUS at 09:11

## 2023-11-02 RX ADMIN — Medication 80 MG: at 11:11

## 2023-11-02 RX ADMIN — Medication 20 MG: at 11:11

## 2023-11-02 RX ADMIN — PROPOFOL 150 MCG/KG/MIN: 10 INJECTION, EMULSION INTRAVENOUS at 11:11

## 2023-11-02 RX ADMIN — LIDOCAINE HYDROCHLORIDE 50 MG: 20 INJECTION, SOLUTION INTRAVENOUS at 11:11

## 2023-11-02 NOTE — [employer] PATIENT INSTRUCTIONS
Discharge Summary/Instructions after an Endoscopic Procedure  Patient Name: Kenan Villalobos  Patient MRN: [id number]  Patient YOB: 1965 Thursday, November 2, 2023  Marko Buenrsotro MD  Dear patient,  As a result of recent federal legislation (The Federal Cures Act), you may   receive lab or pathology results from your procedure in your [facility]   account before your physician is able to contact you. Your physician or   their representative will relay the results to you with their   recommendations at their soonest availability.  Thank you,  Your health is very important to us during the Covid Crisis. Following your   procedure today, you will receive a daily text for 2 weeks asking about   signs or symptoms of Covid 19.  Please respond to this text when you   receive it so we can follow up and keep you as safe as possible.   RESTRICTIONS:  During your procedure today, you received medications for sedation.  These   medications may affect your judgment, balance and coordination.  Therefore,   for 24 hours, you have the following restrictions:   - DO NOT drive a car, operate machinery, make legal/financial decisions,   sign important papers or drink alcohol.    ACTIVITY:  Today: no heavy lifting, straining or running due to procedural   sedation/anesthesia.  The following day: return to full activity including work.  DIET:  Eat and drink normally unless instructed otherwise.     TREATMENT FOR COMMON SIDE EFFECTS:  - Mild abdominal pain, nausea, belching, bloating or excessive gas:  rest,   eat lightly and use a heating pad.  - Sore Throat: treat with throat lozenges and/or gargle with warm salt   water.  - Because air was used during the procedure, expelling large amounts of air   from your rectum or belching is normal.  - If a bowel prep was taken, you may not have a bowel movement for 1-3 days.    This is normal.  SYMPTOMS TO WATCH FOR AND REPORT TO YOUR PHYSICIAN:  1. Abdominal pain or bloating, other  than gas cramps.  2. Chest pain.  3. Back pain.  4. Signs of infection such as: chills or fever occurring within 24 hours   after the procedure.  5. Rectal bleeding, which would show as bright red, maroon, or black stools.   (A tablespoon of blood from the rectum is not serious, especially if   hemorrhoids are present.)  6. Vomiting.  7. Weakness or dizziness.  GO DIRECTLY TO THE NEAREST EMERGENCY ROOM IF YOU HAVE ANY OF THE FOLLOWING:      Difficulty breathing              Chills and/or fever over 101 F   Persistent vomiting and/or vomiting blood   Severe abdominal pain   Severe chest pain   Black, tarry stools   Bleeding- more than one tablespoon   Any other symptom or condition that you feel may need urgent attention  Your doctor recommends these additional instructions:  If any biopsies were taken, your doctors clinic will contact you in 1 to 2   weeks with any results.  - Discharge patient to home.   - Patient has a contact number available for emergencies.  The signs and   symptoms of potential delayed complications were discussed with the   patient.  Return to normal activities tomorrow.  Written discharge   instructions were provided to the patient.   - Resume previous diet.   - Continue present medications.   - Await pathology results.   - Repeat colonoscopy in 7 years for surveillance.  For questions, problems or results please call your physician - Marko Buenrostro MD.  EMERGENCY PHONE NUMBER: [phone],  LAB RESULTS: [phone]  IF A COMPLICATION OR EMERGENCY SITUATION ARISES AND YOU ARE UNABLE TO REACH   YOUR PHYSICIAN - GO DIRECTLY TO THE EMERGENCY ROOM.  Marko Buenrostro MD  11/2/2023 11:26:41 AM  This report has been verified and signed electronically.  Dear patient,  As a result of recent federal legislation (The Federal Cures Act), you may   receive lab or pathology results from your procedure in your [facility]   account before your physician is able to contact you. Your physician or    their representative will relay the results to you with their   recommendations at their soonest availability.  Thank you,  [employer]ATION

## 2023-11-02 NOTE — H&P
Short Stay Endoscopy History and Physical    PCP - Guy Sena III, MD    Procedure - Colonoscopy  ASA - per anesthesia  Mallampati - per anesthesia  History of Anesthesia problems - no  Family history Anesthesia problems - no   Plan of anesthesia - General    HPI:  This is a 58 y.o. male here for evaluation of : family history of colon cancer (  )      ROS:  Constitutional: No fevers, chills, No weight loss  CV: No chest pain  Pulm: No cough, No shortness of breath  GI: see HPI  Derm: No rash    Medical History:  has a past medical history of GERD with esophagitis (8/9/2018), Hemorrhoid (8/4/2014), and Hypertension.    Surgical History:  has a past surgical history that includes Colonoscopy (N/A, 7/11/2017).    Family History: family history includes Cancer in his father; Glaucoma in his mother; Hypertension in his mother; Prostate cancer in his paternal uncle, paternal uncle, paternal uncle, and paternal uncle.. Otherwise no colon cancer, inflammatory bowel disease, or GI malignancies.    Social History:  reports that he has never smoked. He has never used smokeless tobacco. He reports current alcohol use. He reports that he does not use drugs.    Review of patient's allergies indicates:   Allergen Reactions    Amlodipine Edema     Ankle edema       Medications:   Medications Prior to Admission   Medication Sig Dispense Refill Last Dose    losartan (COZAAR) 100 MG tablet Take 1 tablet (100 mg total) by mouth once daily. 90 tablet 1 11/1/2023    pantoprazole (PROTONIX) 40 MG tablet Take 1 tablet (40 mg total) by mouth once daily. 30 tablet 11 Past Month    rosuvastatin (CRESTOR) 5 MG tablet Take 1 tablet (5 mg total) by mouth once daily. 90 tablet 3 11/1/2023    spironolactone (ALDACTONE) 25 MG tablet Take 1 tablet (25 mg total) by mouth once daily. 90 tablet 3 11/1/2023    clotrimazole-betamethasone (LOTRISONE) lotion Apply topically 2 (two) times daily. 30 mL 0 Unknown         Physical Exam:    Vital  Signs:   Vitals:    11/02/23 0954   BP: (!) 142/89   Pulse: 86   Resp: 18   Temp: 98.6 °F (37 °C)       General Appearance: Well appearing in no acute distress  Eyes:    No scleral icterus  ENT: Neck supple, Lips, mucosa, and tongue normal; teeth and gums normal  Abdomen: Soft, non tender, non distended with positive bowel sounds. No hepatosplenomegaly, ascites, or mass.  Extremities: 2+ pulses, no clubbing, cyanosis or edema  Skin: No rash      Labs:  Lab Results   Component Value Date    WBC 5.16 08/19/2020    HGB 15.4 08/19/2020    HCT 45.3 08/19/2020     08/19/2020    CHOL 184 07/05/2023    TRIG 58 07/05/2023    HDL 46 07/05/2023    ALT 42 07/05/2023    AST 26 07/05/2023     07/05/2023    K 4.1 07/05/2023     07/05/2023    CREATININE 1.2 07/05/2023    BUN 14 07/05/2023    CO2 28 07/05/2023    TSH 0.764 08/19/2020    PSA 0.90 11/11/2022    HGBA1C 5.5 07/05/2023       I have explained the risks and benefits of endoscopy procedures to the patient including but not limited to bleeding, perforation, infection, and death.  The patient was asked if they understand and allowed to ask any further questions to their satisfaction.    Marko Buenrostro MD

## 2023-11-02 NOTE — TRANSFER OF CARE
"Anesthesia Transfer of Care Note    Patient: Kenan Villalobos    Procedure(s) Performed: Procedure(s) (LRB):  COLONOSCOPY (N/A)    Patient location: GI    Anesthesia Type: general    Transport from OR: Transported from OR on room air with adequate spontaneous ventilation    Post pain: adequate analgesia    Post assessment: no apparent anesthetic complications    Post vital signs: stable    Level of consciousness: awake    Nausea/Vomiting: no nausea/vomiting    Complications: none    Transfer of care protocol was followed      Last vitals: Visit Vitals  BP (!) 142/89   Pulse 86   Temp 37 °C (98.6 °F)   Resp 18   Ht 5' 11" (1.803 m)   Wt 94.8 kg (209 lb)   SpO2 99%   BMI 29.15 kg/m²     "

## 2023-11-02 NOTE — ANESTHESIA POSTPROCEDURE EVALUATION
Anesthesia Post Evaluation    Patient: Kenan Villalobos    Procedure(s) Performed: Procedure(s) (LRB):  COLONOSCOPY (N/A)    Final Anesthesia Type: general      Patient location during evaluation: PACU  Patient participation: Yes- Able to Participate  Level of consciousness: awake and alert  Post-procedure vital signs: reviewed and stable  Pain management: adequate  Airway patency: patent    PONV status at discharge: No PONV  Anesthetic complications: no      Cardiovascular status: stable  Respiratory status: spontaneous ventilation  Hydration status: euvolemic  Follow-up not needed.          Vitals Value Taken Time   /91 11/02/23 1158   Temp 36.7 °C (98.1 °F) 11/02/23 1128   Pulse 74 11/02/23 1158   Resp 18 11/02/23 1158   SpO2 99 % 11/02/23 1158         Event Time   Out of Recovery 12:16:59         Pain/Alma Delia Score: Alma Delia Score: 10 (11/2/2023 11:58 AM)

## 2023-11-02 NOTE — ANESTHESIA PREPROCEDURE EVALUATION
11/02/2023  Kenan Villalobos is a 58 y.o., male.      Pre-op Assessment    I have reviewed the Patient Summary Reports.     I have reviewed the Nursing Notes. I have reviewed the NPO Status.   I have reviewed the Medications.     Review of Systems  Anesthesia Hx:             Denies Family Hx of Anesthesia complications.    Denies Personal Hx of Anesthesia complications.                    Cardiovascular:     Hypertension                                            Physical Exam    Airway:  Mallampati: II   Mouth Opening: Normal  Neck ROM: Normal ROM        Anesthesia Plan  Type of Anesthesia, risks & benefits discussed:    Anesthesia Type: Gen Natural Airway  Intra-op Monitoring Plan: Standard ASA Monitors  Post Op Pain Control Plan: multimodal analgesia  Induction:  IV  Informed Consent: Informed consent signed with the Patient and all parties understand the risks and agree with anesthesia plan.  All questions answered.   ASA Score: 2  Day of Surgery Review of History & Physical: H&P Update referred to the surgeon/provider.    Ready For Surgery From Anesthesia Perspective.     .

## 2023-11-06 LAB
FINAL PATHOLOGIC DIAGNOSIS: NORMAL
GROSS: NORMAL
Lab: NORMAL

## 2023-11-26 DIAGNOSIS — E78.5 HYPERLIPIDEMIA, UNSPECIFIED HYPERLIPIDEMIA TYPE: ICD-10-CM

## 2023-11-26 DIAGNOSIS — I10 ESSENTIAL HYPERTENSION: ICD-10-CM

## 2023-11-26 NOTE — TELEPHONE ENCOUNTER
No care due was identified.  [facility] Embedded Care Due Messages. Reference number: [id number].   11/26/2023 11:51:25 AM CST

## 2023-11-27 RX ORDER — PANTOPRAZOLE SODIUM 40 MG/1
40 TABLET, DELAYED RELEASE ORAL DAILY
Qty: 30 TABLET | Refills: 11 | Status: SHIPPED | OUTPATIENT
Start: 2023-11-27 | End: 2024-11-26

## 2023-11-27 RX ORDER — ROSUVASTATIN CALCIUM 5 MG/1
5 TABLET, COATED ORAL DAILY
Qty: 90 TABLET | Refills: 1 | Status: SHIPPED | OUTPATIENT
Start: 2023-11-27

## 2023-11-27 NOTE — TELEPHONE ENCOUNTER
Refill Routing Note   Medication(s) are not appropriate for processing by [facility] for the following reason(s):        Required vitals abnormal    [facility] action(s):  Approve  Defer               Appointments  past 12m or future 3m with PCP    Date Provider   Last Visit   7/12/2023 Guy Sena III, MD   Next Visit   1/17/2024 Guy Sena III, MD   ED visits in past 90 days: 0        Note composed:1:08 PM 11/27/2023

## 2023-11-28 RX ORDER — LOSARTAN POTASSIUM 100 MG/1
100 TABLET ORAL DAILY
Qty: 90 TABLET | Refills: 1 | Status: SHIPPED | OUTPATIENT
Start: 2023-11-28

## 2023-11-28 RX ORDER — SPIRONOLACTONE 25 MG/1
25 TABLET ORAL DAILY
Qty: 90 TABLET | Refills: 3 | Status: SHIPPED | OUTPATIENT
Start: 2023-11-28

## 2023-12-11 ENCOUNTER — TELEPHONE (OUTPATIENT)
Dept: OPTOMETRY | Facility: CLINIC | Age: 58
End: 2023-12-11
Payer: COMMERCIAL

## 2023-12-11 NOTE — TELEPHONE ENCOUNTER
"----- Message from Donato Bucio sent at 12/11/2023  2:23 PM CST -----  Reschedule Existing Appointment      Appt Date: 12/11    Type of appt: [facility],OCT and annual    Physician: [place]    Reason for rescheduling? Appt time no longer works; Requesting to r/s for soonest available    Caller: Self    Contact Preference: [phone]         Additional Information:  "Thank you for all that you do for our patients"      "

## 2024-01-16 NOTE — PROGRESS NOTES
Assessment:         1. Essential hypertension    2. Class 1 obesity due to excess calories with serious comorbidity and body mass index (BMI) of 30.0 to 30.9 in adult    3. Prediabetes    4. Preventative health care          Plan:           Kenan was seen today for follow-up.    Diagnoses and all orders for this visit:    Essential hypertension  Chronic  Controlled  Patient is at goal today   I have reviewed lifestyle modification to achieve/maintain goals  We will continue the current medication regimen as listed below  Patient will follow up in 6 months   -     Basic Metabolic Panel; Future  -     PSA, Screening; Future  -     Lipid Panel; Future  -     Basic Metabolic Panel; Standing    1. Class 1 obesity due to excess calories with serious comorbidity and body mass index (BMI) of 30.0 to 30.9 in adult  Chronic  Uncontrolled  Patient is not at goal today  I have reviewed lifestyle modification to achieve/maintain goals  We will continue the current medication regimen as listed below  Patient will follow up in 6 months   - Lipid Panel; Future      Prediabetes  Chronic  Controlled  Patient is at goal today   I have reviewed lifestyle modification to achieve/maintain goals  We will continue the current medication regimen as listed below  Patient will follow up in 3 months   -     Basic Metabolic Panel; Future  -     PSA, Screening; Future  -     Lipid Panel; Future  -     Hemoglobin A1C; Future    Preventative health care  -     Basic Metabolic Panel; Future  -     PSA, Screening; Future  -     Lipid Panel; Future    Other orders  -- I reviewed the patient vaccine history and provided the risk benefits and side effects of the vaccine.   -- I provided the patient a VIS sheet on the vaccine.   -     Influenza - Quadrivalent *Preferred* (6 months+) (PF)      Labs today if wnl then fu in 6 months   Flu shot   Ask about local anestetic for after the injections for kelioid         Subjective:       Patient ID: Kenan  "SUMAYA Villalobos is a 58 y.o. male.    Chief Complaint: Follow-up        Interim Hx  Last seen by me in July  Did Colonscopy   Concerns today      Chronic problems       Hypertension  This is a chronic problem. The current episode started more than 1 year ago. The problem has been waxing and waning since onset. Past treatments include angiotensin blockers (MRA).   Hyperlipidemia  This is a chronic problem. The current episode started more than 1 year ago. The problem is controlled. Current antihyperlipidemic treatment includes statins.       Review of Systems   All other systems reviewed and are negative.            Health Maintenance Due   Topic Date Due    Shingles Vaccine (2 of 2) 03/06/2020    COVID-19 Vaccine (4 - 2023-24 season) 09/01/2023    PROSTATE-SPECIFIC ANTIGEN  11/11/2023         Objective:     /72 (BP Location: Right arm, Patient Position: Sitting, BP Method: Large (Manual))   Pulse 80   Ht 5' 11" (1.803 m)   Wt 98.5 kg (217 lb 2.5 oz)   SpO2 98%   BMI 30.29 kg/m²         1/17/2024     2:18 PM 11/2/2023     9:54 AM 7/12/2023     9:07 AM 1/10/2023    11:27 AM 12/6/2022    10:04 AM   Vitals   Height 5' 11" (1.803 m) 5' 11" (1.803 m) 5' 11" (1.803 m)  5' 11" (1.803 m)   Weight (lbs) 217.15 209 209 210 213.63   BMI (kg/m2) 30.3 29.2 29.2  29.8                Physical Exam  Vitals and nursing note reviewed.   Constitutional:       General: He is not in acute distress.     Appearance: He is well-developed. He is not ill-appearing, toxic-appearing or diaphoretic.   HENT:      Head: Normocephalic.   Eyes:      Conjunctiva/sclera: Conjunctivae normal.   Cardiovascular:      Rate and Rhythm: Normal rate and regular rhythm.      Heart sounds: Normal heart sounds. No murmur heard.     No friction rub. No gallop.   Pulmonary:      Effort: Pulmonary effort is normal. No respiratory distress.   Abdominal:      General: There is no distension.      Palpations: Abdomen is soft.   Neurological:      General: No " focal deficit present.      Mental Status: He is alert and oriented to person, place, and time.           No results found for this or any previous visit (from the past 336 hour(s)).    Future Appointments   Date Time Provider Department Center   1/24/2024  8:45 AM SHELBY, VISUAL [facility]ELDS [facility] [place]   1/24/2024  9:20 AM Kaila Merrill OD [facility] [place]   7/12/2024  8:30 AM LAB, YOAV [facility] LAB [place]   7/17/2024  2:20 PM Guy Sena III, MD [facility]         Medication List with Changes/Refills   Current Medications    CLOTRIMAZOLE-BETAMETHASONE (LOTRISONE) LOTION    Apply topically 2 (two) times daily.    LOSARTAN (COZAAR) 100 MG TABLET    Take 1 tablet (100 mg total) by mouth once daily.    PANTOPRAZOLE (PROTONIX) 40 MG TABLET    Take 1 tablet (40 mg total) by mouth once daily.    ROSUVASTATIN (CRESTOR) 5 MG TABLET    Take 1 tablet (5 mg total) by mouth once daily.    SPIRONOLACTONE (ALDACTONE) 25 MG TABLET    Take 1 tablet (25 mg total) by mouth once daily.         Disclaimer:  This note has been generated using voice-recognition software. There may be grammatical or spelling errors that have been missed during proof-reading

## 2024-01-17 ENCOUNTER — OFFICE VISIT (OUTPATIENT)
Dept: INTERNAL MEDICINE | Facility: CLINIC | Age: 59
End: 2024-01-17
Payer: COMMERCIAL

## 2024-01-17 ENCOUNTER — LAB VISIT (OUTPATIENT)
Dept: LAB | Facility: HOSPITAL | Age: 59
End: 2024-01-17
Attending: INTERNAL MEDICINE
Payer: COMMERCIAL

## 2024-01-17 VITALS
WEIGHT: 217.13 LBS | HEART RATE: 80 BPM | BODY MASS INDEX: 30.4 KG/M2 | DIASTOLIC BLOOD PRESSURE: 72 MMHG | HEIGHT: 71 IN | SYSTOLIC BLOOD PRESSURE: 130 MMHG | OXYGEN SATURATION: 98 %

## 2024-01-17 DIAGNOSIS — I10 ESSENTIAL HYPERTENSION: Primary | ICD-10-CM

## 2024-01-17 DIAGNOSIS — Z00.00 PREVENTATIVE HEALTH CARE: ICD-10-CM

## 2024-01-17 DIAGNOSIS — E66.09 CLASS 1 OBESITY DUE TO EXCESS CALORIES WITH SERIOUS COMORBIDITY AND BODY MASS INDEX (BMI) OF 30.0 TO 30.9 IN ADULT: ICD-10-CM

## 2024-01-17 DIAGNOSIS — R73.03 PREDIABETES: ICD-10-CM

## 2024-01-17 DIAGNOSIS — I10 ESSENTIAL HYPERTENSION: ICD-10-CM

## 2024-01-17 LAB
ANION GAP SERPL CALC-SCNC: 9 MMOL/L (ref 8–16)
BUN SERPL-MCNC: 11 MG/DL (ref 6–20)
CALCIUM SERPL-MCNC: 9.6 MG/DL (ref 8.7–10.5)
CHLORIDE SERPL-SCNC: 106 MMOL/L (ref 95–110)
CHOLEST SERPL-MCNC: 173 MG/DL (ref 120–199)
CHOLEST/HDLC SERPL: 3.8 {RATIO} (ref 2–5)
CO2 SERPL-SCNC: 25 MMOL/L (ref 23–29)
COMPLEXED PSA SERPL-MCNC: 0.79 NG/ML (ref 0–4)
CREAT SERPL-MCNC: 1.3 MG/DL (ref 0.5–1.4)
EST. GFR  (NO RACE VARIABLE): >60 ML/MIN/1.73 M^2
ESTIMATED AVG GLUCOSE: 114 MG/DL (ref 68–131)
GLUCOSE SERPL-MCNC: 81 MG/DL (ref 70–110)
HBA1C MFR BLD: 5.6 % (ref 4–5.6)
HDLC SERPL-MCNC: 46 MG/DL (ref 40–75)
HDLC SERPL: 26.6 % (ref 20–50)
LDLC SERPL CALC-MCNC: 112 MG/DL (ref 63–159)
NONHDLC SERPL-MCNC: 127 MG/DL
POTASSIUM SERPL-SCNC: 4.5 MMOL/L (ref 3.5–5.1)
SODIUM SERPL-SCNC: 140 MMOL/L (ref 136–145)
TRIGL SERPL-MCNC: 75 MG/DL (ref 30–150)

## 2024-01-17 PROCEDURE — 99214 OFFICE O/P EST MOD 30 MIN: CPT | Mod: PBBFAC,PO | Performed by: INTERNAL MEDICINE

## 2024-01-17 PROCEDURE — 83036 HEMOGLOBIN GLYCOSYLATED A1C: CPT | Performed by: INTERNAL MEDICINE

## 2024-01-17 PROCEDURE — 80061 LIPID PANEL: CPT | Performed by: INTERNAL MEDICINE

## 2024-01-17 PROCEDURE — 99999 PR PBB SHADOW E&M-EST. PATIENT-LVL IV: CPT | Mod: PBBFAC,,, | Performed by: INTERNAL MEDICINE

## 2024-01-17 PROCEDURE — 36415 COLL VENOUS BLD VENIPUNCTURE: CPT | Mod: PO | Performed by: INTERNAL MEDICINE

## 2024-01-17 PROCEDURE — 80048 BASIC METABOLIC PNL TOTAL CA: CPT | Performed by: INTERNAL MEDICINE

## 2024-01-17 PROCEDURE — 90471 IMMUNIZATION ADMIN: CPT | Mod: PBBFAC,PO

## 2024-01-17 PROCEDURE — 84153 ASSAY OF PSA TOTAL: CPT | Performed by: INTERNAL MEDICINE

## 2024-01-24 ENCOUNTER — CLINICAL SUPPORT (OUTPATIENT)
Dept: OPHTHALMOLOGY | Facility: CLINIC | Age: 59
End: 2024-01-24
Payer: COMMERCIAL

## 2024-01-24 ENCOUNTER — OFFICE VISIT (OUTPATIENT)
Dept: OPTOMETRY | Facility: CLINIC | Age: 59
End: 2024-01-24
Payer: COMMERCIAL

## 2024-01-24 DIAGNOSIS — H40.1231 LOW-TENSION GLAUCOMA OF BOTH EYES, MILD STAGE: Primary | ICD-10-CM

## 2024-01-24 DIAGNOSIS — H40.013 OPEN ANGLE WITH BORDERLINE FINDINGS AND LOW GLAUCOMA RISK IN BOTH EYES: ICD-10-CM

## 2024-01-24 DIAGNOSIS — H40.1131 PRIMARY OPEN ANGLE GLAUCOMA (POAG) OF BOTH EYES, MILD STAGE: ICD-10-CM

## 2024-01-24 DIAGNOSIS — H52.203 MYOPIA OF BOTH EYES WITH ASTIGMATISM AND PRESBYOPIA: ICD-10-CM

## 2024-01-24 DIAGNOSIS — H52.4 MYOPIA OF BOTH EYES WITH ASTIGMATISM AND PRESBYOPIA: ICD-10-CM

## 2024-01-24 DIAGNOSIS — H52.13 MYOPIA OF BOTH EYES WITH ASTIGMATISM AND PRESBYOPIA: ICD-10-CM

## 2024-01-24 PROCEDURE — 4010F ACE/ARB THERAPY RXD/TAKEN: CPT | Mod: CPTII,S$GLB,, | Performed by: OPTOMETRIST

## 2024-01-24 PROCEDURE — 1159F MED LIST DOCD IN RCRD: CPT | Mod: CPTII,S$GLB,, | Performed by: OPTOMETRIST

## 2024-01-24 PROCEDURE — 92015 DETERMINE REFRACTIVE STATE: CPT | Mod: S$GLB,,, | Performed by: OPTOMETRIST

## 2024-01-24 PROCEDURE — 92014 COMPRE OPH EXAM EST PT 1/>: CPT | Mod: S$GLB,,, | Performed by: OPTOMETRIST

## 2024-01-24 PROCEDURE — 99999 PR PBB SHADOW E&M-EST. PATIENT-LVL III: CPT | Mod: PBBFAC,,, | Performed by: OPTOMETRIST

## 2024-01-24 PROCEDURE — 1160F RVW MEDS BY RX/DR IN RCRD: CPT | Mod: CPTII,S$GLB,, | Performed by: OPTOMETRIST

## 2024-01-24 PROCEDURE — 3044F HG A1C LEVEL LT 7.0%: CPT | Mod: CPTII,S$GLB,, | Performed by: OPTOMETRIST

## 2024-01-24 RX ORDER — CHLORTHALIDONE 25 MG/1
TABLET ORAL
COMMUNITY

## 2024-01-24 RX ORDER — KETOCONAZOLE 20 MG/ML
SHAMPOO, SUSPENSION TOPICAL
COMMUNITY

## 2024-01-24 RX ORDER — LISINOPRIL 30 MG/1
TABLET ORAL
COMMUNITY

## 2024-01-24 RX ORDER — NABUMETONE 750 MG/1
TABLET, FILM COATED ORAL
COMMUNITY

## 2024-01-24 RX ORDER — METHOCARBAMOL 500 MG/1
TABLET, FILM COATED ORAL
COMMUNITY

## 2024-01-24 RX ORDER — GABAPENTIN 100 MG/1
CAPSULE ORAL
COMMUNITY

## 2024-01-24 RX ORDER — HYDRALAZINE HYDROCHLORIDE 50 MG/1
TABLET, FILM COATED ORAL
COMMUNITY

## 2024-01-24 RX ORDER — KETOCONAZOLE 20 MG/G
CREAM TOPICAL
COMMUNITY

## 2024-01-24 RX ORDER — CARVEDILOL 3.12 MG/1
TABLET ORAL
COMMUNITY

## 2024-01-24 NOTE — PROGRESS NOTES
RAKESH    CORA: 01/23   Chief complaint (CC): Patient is here for annual eye exam today. Patient   hasn't noticed any vision changes since the last exam exam.  Glasses still   seem fine.  Glasses? + 1 yr. old  Contacts? -  H/o eye surgery, injections or laser: -  H/o eye injury: -  Known eye conditions? See above  Family h/o eye conditions? Mother with glaucoma  Eye gtts? -      (-) Flashes (-)  Floaters (-) Mucous   (-)  Tearing (-) Itching (-) Burning   (-) Headaches (-) Eye Pain/discomfort (-) Irritation   (-)  Redness (-) Double vision (-) Blurry vision    Diabetic? -  A1c? -      Last edited by Angelina Caraballo on 1/24/2024  9:33 AM.            Assessment /Plan     For exam results, see Encounter Report.      Open angle with borderline findings, low risk, bilateral  (+)FHx-mom. IOP 14 OD, OS. Last 10 OD, 12 OS.  C/d 0.75 OD, OS. Pachy 492 OD, 513 OS.   1/31/2023 OCT OD thin N, G and TI, borderline NS and NI, OS thin TS, G and TI 79 (prev 100), borderline NI   1/24/2024 OCT OD thin N, G, and TI, borderline NS and NI, OS thin TS, G and TI  1/31/2023 HVF OD borderline w/excessive high false positives, OS excessive high false positives w/nasal defects.   1/24/2024 HVF OD scattered defect, OS inf and sup nasal step  Stable testing  Educated pt on findings w/understanding.   RTC 1 year Routine.    Myopia of both eyes with astigmatism and presbyopia  SRx released to patient. Patient educated on lens options. Normal ocular health. RTC 1 year for routine exam.

## 2024-01-24 NOTE — PROGRESS NOTES
HVF/OCT rel/fix coop good OU/chart checked for latex allergy/-2.75 + 1.25 x 011 OD -4.00 + 1.50 x 170 OS - BJ

## 2024-05-26 DIAGNOSIS — I10 ESSENTIAL HYPERTENSION: ICD-10-CM

## 2024-05-26 NOTE — TELEPHONE ENCOUNTER
Care Due:                  Date            Visit Type   Department     Provider  --------------------------------------------------------------------------------                                EP -                              PRIMARY      [facility] INTERNAL  Last Visit: 01-      CARE ([facility])   QING Sena                               -                              PRIMARY      [facility] INTERNAL  Next Visit: 07-      CARE ([facility])   MEDICINE       Guy Sena                                                            Last  Test          Frequency    Reason                     Performed    Due Date  --------------------------------------------------------------------------------    CMP.........  12 months..  rosuvastatin.............  11- 06-    [facility] Embedded Care Due Messages. Reference number: [id number].   5/26/2024 12:39:50 PM CDT

## 2024-05-27 RX ORDER — LOSARTAN POTASSIUM 100 MG/1
100 TABLET ORAL DAILY
Qty: 90 TABLET | Refills: 2 | Status: SHIPPED | OUTPATIENT
Start: 2024-05-27

## 2024-05-27 RX ORDER — SPIRONOLACTONE 25 MG/1
25 TABLET ORAL DAILY
Qty: 90 TABLET | Refills: 2 | Status: SHIPPED | OUTPATIENT
Start: 2024-05-27

## 2024-05-27 NOTE — TELEPHONE ENCOUNTER
Provider Staff:  Action required for this patient    Requires labs      Please see care gap opportunities below in Care Due Message.    Thanks!  [facility]     Appointments      Date Provider   Last Visit   1/17/2024 Guy Sena III, MD   Next Visit   7/17/2024 Guy Sena III, MD     Refill Decision Note   Kenan Villalobos  is requesting a refill authorization.  Brief Assessment and Rationale for Refill:  Approve     Medication Therapy Plan:  CMP due for Refill Center to sign rosuvastatin, BMP currently scheduled      Pharmacist review requested: Yes   Comments:     Note composed:11:35 AM 05/27/2024

## 2024-05-27 NOTE — TELEPHONE ENCOUNTER
Refill Routing Note   Medication(s) are not appropriate for processing by [facility] for the following reason(s):        Drug-drug interaction:     [facility] action(s):  Defer     Requires labs : Yes    Medication Therapy Plan:  CMP due for Refill Center to sign rosuvastatin, BMP currently scheduled    Pharmacist review requested: Yes     Appointments  past 12m or future 3m with PCP    Date Provider   Last Visit   1/17/2024 Guy Sena III, MD   Next Visit   7/17/2024 Guy Sena III, MD   ED visits in past 90 days: 0        Note composed:11:15 AM 05/27/2024

## 2024-07-12 ENCOUNTER — LAB VISIT (OUTPATIENT)
Dept: LAB | Facility: HOSPITAL | Age: 59
End: 2024-07-12
Attending: INTERNAL MEDICINE
Payer: COMMERCIAL

## 2024-07-12 DIAGNOSIS — I10 ESSENTIAL HYPERTENSION: ICD-10-CM

## 2024-07-12 PROCEDURE — 36415 COLL VENOUS BLD VENIPUNCTURE: CPT | Mod: PO | Performed by: INTERNAL MEDICINE

## 2024-07-12 PROCEDURE — 80048 BASIC METABOLIC PNL TOTAL CA: CPT | Performed by: INTERNAL MEDICINE

## 2024-07-13 LAB
ANION GAP SERPL CALC-SCNC: 8 MMOL/L (ref 8–16)
BUN SERPL-MCNC: 13 MG/DL (ref 6–20)
CALCIUM SERPL-MCNC: 9.3 MG/DL (ref 8.7–10.5)
CHLORIDE SERPL-SCNC: 104 MMOL/L (ref 95–110)
CO2 SERPL-SCNC: 24 MMOL/L (ref 23–29)
CREAT SERPL-MCNC: 1.1 MG/DL (ref 0.5–1.4)
EST. GFR  (NO RACE VARIABLE): >60 ML/MIN/1.73 M^2
GLUCOSE SERPL-MCNC: 89 MG/DL (ref 70–110)
POTASSIUM SERPL-SCNC: 4.3 MMOL/L (ref 3.5–5.1)
SODIUM SERPL-SCNC: 136 MMOL/L (ref 136–145)

## 2024-07-17 ENCOUNTER — OFFICE VISIT (OUTPATIENT)
Dept: INTERNAL MEDICINE | Facility: CLINIC | Age: 59
End: 2024-07-17
Payer: COMMERCIAL

## 2024-07-17 VITALS
SYSTOLIC BLOOD PRESSURE: 128 MMHG | DIASTOLIC BLOOD PRESSURE: 86 MMHG | HEIGHT: 71 IN | BODY MASS INDEX: 29.78 KG/M2 | WEIGHT: 212.75 LBS | HEART RATE: 75 BPM | OXYGEN SATURATION: 98 %

## 2024-07-17 DIAGNOSIS — I10 ESSENTIAL HYPERTENSION: Primary | Chronic | ICD-10-CM

## 2024-07-17 DIAGNOSIS — E78.00 PURE HYPERCHOLESTEROLEMIA: ICD-10-CM

## 2024-07-17 DIAGNOSIS — E66.01 CLASS 2 SEVERE OBESITY DUE TO EXCESS CALORIES WITH SERIOUS COMORBIDITY IN ADULT, UNSPECIFIED BMI: ICD-10-CM

## 2024-07-17 DIAGNOSIS — Z12.5 ENCOUNTER FOR PROSTATE CANCER SCREENING: ICD-10-CM

## 2024-07-17 PROCEDURE — 1159F MED LIST DOCD IN RCRD: CPT | Mod: CPTII,S$GLB,, | Performed by: INTERNAL MEDICINE

## 2024-07-17 PROCEDURE — 3074F SYST BP LT 130 MM HG: CPT | Mod: CPTII,S$GLB,, | Performed by: INTERNAL MEDICINE

## 2024-07-17 PROCEDURE — G2211 COMPLEX E/M VISIT ADD ON: HCPCS | Mod: S$GLB,,, | Performed by: INTERNAL MEDICINE

## 2024-07-17 PROCEDURE — 3079F DIAST BP 80-89 MM HG: CPT | Mod: CPTII,S$GLB,, | Performed by: INTERNAL MEDICINE

## 2024-07-17 PROCEDURE — 4010F ACE/ARB THERAPY RXD/TAKEN: CPT | Mod: CPTII,S$GLB,, | Performed by: INTERNAL MEDICINE

## 2024-07-17 PROCEDURE — 99999 PR PBB SHADOW E&M-EST. PATIENT-LVL IV: CPT | Mod: PBBFAC,,, | Performed by: INTERNAL MEDICINE

## 2024-07-17 PROCEDURE — 3044F HG A1C LEVEL LT 7.0%: CPT | Mod: CPTII,S$GLB,, | Performed by: INTERNAL MEDICINE

## 2024-07-17 PROCEDURE — 99214 OFFICE O/P EST MOD 30 MIN: CPT | Mod: S$GLB,,, | Performed by: INTERNAL MEDICINE

## 2024-07-17 PROCEDURE — 1160F RVW MEDS BY RX/DR IN RCRD: CPT | Mod: CPTII,S$GLB,, | Performed by: INTERNAL MEDICINE

## 2024-07-17 PROCEDURE — 3008F BODY MASS INDEX DOCD: CPT | Mod: CPTII,S$GLB,, | Performed by: INTERNAL MEDICINE

## 2024-07-17 NOTE — PROGRESS NOTES
"  Assessment:         1. Essential hypertension    2. Class 2 severe obesity due to excess calories with serious comorbidity in adult, unspecified BMI    3. Pure hypercholesterolemia    4. Encounter for prostate cancer screening          Plan:           Kenan was seen today for follow-up.    Diagnoses and all orders for this visit:    Essential hypertension  -     Basic Metabolic Panel; Standing    Class 2 severe obesity due to excess calories with serious comorbidity in adult, unspecified BMI    Pure hypercholesterolemia  -     Lipid Panel; Standing    Encounter for prostate cancer screening  -     PSA, Screening; Standing          6 month with prelabs      Subjective:       Patient ID: Kenan Villalobos is a 59 y.o. male.    Chief Complaint: Follow-up          Interim Hx  Bmp wnl  Seen by optho  Last seen by me January     Concerns today  none    Chronic problems      Hypertension  This is a chronic problem. The current episode started more than 1 year ago. The problem has been waxing and waning since onset.   Hyperlipidemia  This is a chronic problem. The current episode started more than 1 year ago. The problem is controlled. Current antihyperlipidemic treatment includes statins. The current treatment provides significant improvement of lipids. There are no compliance problems.        Review of Systems   All other systems reviewed and are negative.            Health Maintenance Due   Topic Date Due    Shingles Vaccine (2 of 2) 03/06/2020    COVID-19 Vaccine (4 - 2023-24 season) 09/01/2023         Objective:     /86 (BP Location: Right arm, Patient Position: Sitting, BP Method: Large (Manual))   Pulse 75   Ht 5' 11" (1.803 m)   Wt 96.5 kg (212 lb 11.9 oz)   SpO2 98%   BMI 29.67 kg/m²         7/17/2024     2:07 PM 1/17/2024     2:18 PM 11/2/2023     9:54 AM 7/12/2023     9:07 AM 1/10/2023    11:27 AM   Vitals   Height 5' 11" (1.803 m) 5' 11" (1.803 m) 5' 11" (1.803 m) 5' 11" (1.803 m)    Weight (lbs) " 212.74 217.15 209 209 210   BMI (kg/m2) 29.7 30.3 29.2 29.2                 Physical Exam  Vitals and nursing note reviewed.   Constitutional:       General: He is not in acute distress.     Appearance: He is well-developed. He is not ill-appearing, toxic-appearing or diaphoretic.   HENT:      Head: Normocephalic.   Eyes:      Conjunctiva/sclera: Conjunctivae normal.   Cardiovascular:      Rate and Rhythm: Normal rate and regular rhythm.      Heart sounds: Normal heart sounds. No murmur heard.     No friction rub. No gallop.   Pulmonary:      Effort: Pulmonary effort is normal. No respiratory distress.   Abdominal:      General: There is no distension.      Palpations: Abdomen is soft.   Neurological:      General: No focal deficit present.      Mental Status: He is alert and oriented to person, place, and time.           Recent Results (from the past 336 hour(s))   Basic Metabolic Panel    Collection Time: 07/12/24 11:43 AM   Result Value Ref Range    Sodium 136 136 - 145 mmol/L    Potassium 4.3 3.5 - 5.1 mmol/L    Chloride 104 95 - 110 mmol/L    CO2 24 23 - 29 mmol/L    Glucose 89 70 - 110 mg/dL    BUN 13 6 - 20 mg/dL    Creatinine 1.1 0.5 - 1.4 mg/dL    Calcium 9.3 8.7 - 10.5 mg/dL    Anion Gap 8 8 - 16 mmol/L    eGFR >60.0 >60 mL/min/1.73 m^2         Future Appointments   Date Time Provider Department Center   1/30/2025  8:00 AM LAB, YOAV KENH [facility]   2/3/2025  2:20 PM Guy Sena III, MD [facility]         Medication List with Changes/Refills   Current Medications    CARVEDILOL (COREG) 3.125 MG TABLET        CHLORTHALIDONE (HYGROTEN) 25 MG TAB        CLOTRIMAZOLE-BETAMETHASONE (LOTRISONE) LOTION    Apply topically 2 (two) times daily.    GABAPENTIN (NEURONTIN) 100 MG CAPSULE    TAKE ONE CAPSULE BY MOUTH THREE TIMES DAILY AS NEEDED FOR FACIAL TINGLING    HYDRALAZINE (APRESOLINE) 50 MG TABLET        KETOCONAZOLE (NIZORAL) 2 % CREAM    APPLY TOPICALLY AA QHS    KETOCONAZOLE (NIZORAL) 2 %  SHAMPOO    SHAMPOO ONCE DAILY AND LEAVE IT ON FOR 5 MINUTES AND RINSE. USE FOR 14 DAYS    LOSARTAN (COZAAR) 100 MG TABLET    Take 1 tablet (100 mg total) by mouth once daily.    METHOCARBAMOL (ROBAXIN) 500 MG TAB    TK 1 T PO TID WC FOR 3 TO 10 DAYS    NABUMETONE (RELAFEN) 750 MG TABLET        PANTOPRAZOLE (PROTONIX) 40 MG TABLET    Take 1 tablet (40 mg total) by mouth once daily.    ROSUVASTATIN (CRESTOR) 5 MG TABLET    Take 1 tablet (5 mg total) by mouth once daily.    SPIRONOLACTONE (ALDACTONE) 25 MG TABLET    Take 1 tablet (25 mg total) by mouth once daily.   Discontinued Medications    LISINOPRIL (PRINIVIL,ZESTRIL) 30 MG TABLET             Disclaimer:  This note has been generated using voice-recognition software. There may be grammatical or spelling errors that have been missed during proof-reading Visit complexity inherent to evaluation and management associated with medical care services that serve as the continuing focal point for all needed health care services and/or with medical care services that are part of ongoing care related to a patient's single, serious condition or a complex condition

## 2025-01-22 DIAGNOSIS — R73.03 PREDIABETES: ICD-10-CM

## 2025-01-30 ENCOUNTER — LAB VISIT (OUTPATIENT)
Dept: LAB | Facility: HOSPITAL | Age: 60
End: 2025-01-30
Attending: INTERNAL MEDICINE
Payer: COMMERCIAL

## 2025-01-30 DIAGNOSIS — E78.00 PURE HYPERCHOLESTEROLEMIA: ICD-10-CM

## 2025-01-30 DIAGNOSIS — Z12.5 ENCOUNTER FOR PROSTATE CANCER SCREENING: ICD-10-CM

## 2025-01-30 DIAGNOSIS — I10 ESSENTIAL HYPERTENSION: Chronic | ICD-10-CM

## 2025-01-30 LAB
ANION GAP SERPL CALC-SCNC: 9 MMOL/L (ref 8–16)
BUN SERPL-MCNC: 13 MG/DL (ref 6–20)
CALCIUM SERPL-MCNC: 9.5 MG/DL (ref 8.7–10.5)
CHLORIDE SERPL-SCNC: 104 MMOL/L (ref 95–110)
CHOLEST SERPL-MCNC: 155 MG/DL (ref 120–199)
CHOLEST/HDLC SERPL: 4.2 {RATIO} (ref 2–5)
CO2 SERPL-SCNC: 25 MMOL/L (ref 23–29)
COMPLEXED PSA SERPL-MCNC: 0.84 NG/ML (ref 0–4)
CREAT SERPL-MCNC: 1 MG/DL (ref 0.5–1.4)
EST. GFR  (NO RACE VARIABLE): >60 ML/MIN/1.73 M^2
GLUCOSE SERPL-MCNC: 84 MG/DL (ref 70–110)
HDLC SERPL-MCNC: 37 MG/DL (ref 40–75)
HDLC SERPL: 23.9 % (ref 20–50)
LDLC SERPL CALC-MCNC: 106.6 MG/DL (ref 63–159)
NONHDLC SERPL-MCNC: 118 MG/DL
POTASSIUM SERPL-SCNC: 4.2 MMOL/L (ref 3.5–5.1)
SODIUM SERPL-SCNC: 138 MMOL/L (ref 136–145)
TRIGL SERPL-MCNC: 57 MG/DL (ref 30–150)

## 2025-01-30 PROCEDURE — 84153 ASSAY OF PSA TOTAL: CPT | Performed by: INTERNAL MEDICINE

## 2025-01-30 PROCEDURE — 36415 COLL VENOUS BLD VENIPUNCTURE: CPT | Mod: PO | Performed by: INTERNAL MEDICINE

## 2025-01-30 PROCEDURE — 80061 LIPID PANEL: CPT | Performed by: INTERNAL MEDICINE

## 2025-01-30 PROCEDURE — 80048 BASIC METABOLIC PNL TOTAL CA: CPT | Performed by: INTERNAL MEDICINE

## 2025-02-11 ENCOUNTER — OFFICE VISIT (OUTPATIENT)
Dept: INTERNAL MEDICINE | Facility: CLINIC | Age: 60
End: 2025-02-11
Payer: COMMERCIAL

## 2025-02-11 VITALS
WEIGHT: 213.38 LBS | HEART RATE: 76 BPM | SYSTOLIC BLOOD PRESSURE: 130 MMHG | OXYGEN SATURATION: 98 % | HEIGHT: 71 IN | DIASTOLIC BLOOD PRESSURE: 82 MMHG | BODY MASS INDEX: 29.87 KG/M2

## 2025-02-11 DIAGNOSIS — E66.01 CLASS 2 SEVERE OBESITY DUE TO EXCESS CALORIES WITH SERIOUS COMORBIDITY IN ADULT, UNSPECIFIED BMI: ICD-10-CM

## 2025-02-11 DIAGNOSIS — E66.812 CLASS 2 SEVERE OBESITY DUE TO EXCESS CALORIES WITH SERIOUS COMORBIDITY IN ADULT, UNSPECIFIED BMI: ICD-10-CM

## 2025-02-11 DIAGNOSIS — Z00.00 PREVENTATIVE HEALTH CARE: Primary | ICD-10-CM

## 2025-02-11 PROCEDURE — G2211 COMPLEX E/M VISIT ADD ON: HCPCS | Mod: S$GLB,,, | Performed by: INTERNAL MEDICINE

## 2025-02-11 PROCEDURE — 1159F MED LIST DOCD IN RCRD: CPT | Mod: CPTII,S$GLB,, | Performed by: INTERNAL MEDICINE

## 2025-02-11 PROCEDURE — 99999 PR PBB SHADOW E&M-EST. PATIENT-LVL V: CPT | Mod: PBBFAC,,, | Performed by: INTERNAL MEDICINE

## 2025-02-11 PROCEDURE — 1160F RVW MEDS BY RX/DR IN RCRD: CPT | Mod: CPTII,S$GLB,, | Performed by: INTERNAL MEDICINE

## 2025-02-11 PROCEDURE — 3008F BODY MASS INDEX DOCD: CPT | Mod: CPTII,S$GLB,, | Performed by: INTERNAL MEDICINE

## 2025-02-11 PROCEDURE — 3075F SYST BP GE 130 - 139MM HG: CPT | Mod: CPTII,S$GLB,, | Performed by: INTERNAL MEDICINE

## 2025-02-11 PROCEDURE — 3079F DIAST BP 80-89 MM HG: CPT | Mod: CPTII,S$GLB,, | Performed by: INTERNAL MEDICINE

## 2025-02-11 PROCEDURE — 99214 OFFICE O/P EST MOD 30 MIN: CPT | Mod: S$GLB,,, | Performed by: INTERNAL MEDICINE

## 2025-02-11 NOTE — PROGRESS NOTES
Assessment:         1. Preventative health care    2. Class 2 severe obesity due to excess calories with serious comorbidity in adult, unspecified BMI          Plan:           Kenan was seen today for follow-up.    Diagnoses and all orders for this visit:    Preventative health care  -     Basic Metabolic Panel; Standing  -     CBC Without Differential; Standing  -     Hepatic Function Panel; Standing  -     Hemoglobin A1C; Standing  -     Lipid Panel; Standing  -     TSH; Standing    Class 2 severe obesity due to excess calories with serious comorbidity in adult, unspecified BMI        Assessment & Plan    IMPRESSION:  Reviewed lab results: electrolytes, kidney function, cholesterol panel, and PSA all within normal range  HDL slightly low at 40, consistent with previous results  Positive for lateral epicondylitis (tennis elbow) based on physical exam and reported symptoms  Recommend conservative management for tennis elbow    PATIENT EDUCATION:  - Explained DASH diet as an evidence-based approach to reduce blood pressure  - Discussed potential for gradual reduction of blood pressure medications with lifestyle changes  - Provided information on lateral epicondylitis (tennis elbow) and management options    ACTION ITEMS/LIFESTYLE:  - Patient to continue with new gym membership and exercise routine  - Recommend implementing DASH diet to help lower blood pressure  - Patient to apply ice to affected elbow for 20 minutes on, 20 minutes off, up to 3 times daily  - Patient to perform recommended exercises for tennis elbow (details to be provided in after-visit summary)    MEDICATIONS:  - Continued current blood pressure medications without changes  - Continued use of topical cream (likely Aspercreme) for elbow pain as needed    FOLLOW UP:  - Follow up with either the doctor or physician assistant Gianna, alternating between providers  - After-visit summary will include DASH diet information and specific exercises for  "tennis elbow             Subjective:       Patient ID: Kenan Villalobos is a 60 y.o. male.    Chief Complaint: Follow-up      Interim Hx  Concerns today  Chronic problems        History of Present Illness    CHIEF COMPLAINT:  Patient presents today for follow-up of hypertension, benign prostatic hypertrophy, and hyperlipidemia.    LABS:  HDL is slightly low at 40 mg/dL. PSA is within normal range. Electrolytes and kidney function are normal.    MUSCULOSKELETAL:  He reports tennis elbow pain with minimal relief from aspirin cream.    DIET AND EXERCISE:  He recently obtained a gym membership at [employer] with his wife and is considering working with a  to establish a routine and maintain accountability. He reports challenges with maintaining healthy dietary habits due to frequent fast food consumption related to work schedule. He is considering meal prep services as a potential solution to accommodate his busy schedule.    REVIEW OF SYSTEMS:  He denies headaches, vision changes, chest pain, shortness of breath, nausea, vomiting, diarrhea, and leg swelling. He expresses desire for dental orthodontics.      ROS:  Eyes: -vision changes  Cardiovascular: -chest pain  Respiratory: -shortness of breath  Gastrointestinal: -nausea, -vomiting, -diarrhea  Musculoskeletal: +joint pain  Neurological: -headache           Review of Systems        Health Maintenance Due   Topic Date Due    Pneumococcal Vaccines (Age 50+) (1 of 1 - PCV) Never done    Shingles Vaccine (2 of 2) 03/06/2020    Influenza Vaccine (1) 09/01/2024    COVID-19 Vaccine (4 - 2024-25 season) 09/01/2024    Hemoglobin A1c (Prediabetes)  01/17/2025         Objective:     /82 (BP Location: Right arm, Patient Position: Sitting)   Pulse 76   Ht 5' 11" (1.803 m)   Wt 96.8 kg (213 lb 6.5 oz)   SpO2 98%   BMI 29.76 kg/m²   .Physical Exam                    2/11/2025     8:11 AM 7/17/2024     2:07 PM 1/17/2024     2:18 PM 11/2/2023     " "9:54 AM 7/12/2023     9:07 AM   Vitals   Height 5' 11" (1.803 m) 5' 11" (1.803 m) 5' 11" (1.803 m) 5' 11" (1.803 m) 5' 11" (1.803 m)   Weight (lbs) 213.41 212.74 217.15 209 209   BMI (kg/m2) 29.8 29.7 30.3 29.2 29.2          Physical Exam  Vitals and nursing note reviewed.   Constitutional:       General: He is not in acute distress.     Appearance: He is well-developed. He is not ill-appearing, toxic-appearing or diaphoretic.   HENT:      Head: Normocephalic.   Eyes:      Conjunctiva/sclera: Conjunctivae normal.   Cardiovascular:      Rate and Rhythm: Normal rate and regular rhythm.      Heart sounds: Normal heart sounds. No murmur heard.     No friction rub. No gallop.   Pulmonary:      Effort: Pulmonary effort is normal. No respiratory distress.   Abdominal:      General: There is no distension.      Palpations: Abdomen is soft.   Neurological:      General: No focal deficit present.      Mental Status: He is alert and oriented to person, place, and time.         Recent Results (from the past 2 weeks)   Basic Metabolic Panel    Collection Time: 01/30/25  8:57 AM   Result Value Ref Range    Sodium 138 136 - 145 mmol/L    Potassium 4.2 3.5 - 5.1 mmol/L    Chloride 104 95 - 110 mmol/L    CO2 25 23 - 29 mmol/L    Glucose 84 70 - 110 mg/dL    BUN 13 6 - 20 mg/dL    Creatinine 1.0 0.5 - 1.4 mg/dL    Calcium 9.5 8.7 - 10.5 mg/dL    Anion Gap 9 8 - 16 mmol/L    eGFR >60.0 >60 mL/min/1.73 m^2   Lipid Panel    Collection Time: 01/30/25  8:57 AM   Result Value Ref Range    Cholesterol 155 120 - 199 mg/dL    Triglycerides 57 30 - 150 mg/dL    HDL 37 (L) 40 - 75 mg/dL    LDL Cholesterol 106.6 63.0 - 159.0 mg/dL    HDL/Cholesterol Ratio 23.9 20.0 - 50.0 %    Total Cholesterol/HDL Ratio 4.2 2.0 - 5.0    Non-HDL Cholesterol 118 mg/dL   PSA, Screening    Collection Time: 01/30/25  8:57 AM   Result Value Ref Range    PSA, Screen 0.84 0.00 - 4.00 ng/mL         Future Appointments   Date Time Provider Department Center   8/7/2025  " 7:30 AM LAB, YOAV KENH [facility]   8/11/2025  9:20 AM Guy Sena III, MD [facility]         Medication List with Changes/Refills   Current Medications    CARVEDILOL (COREG) 3.125 MG TABLET        CHLORTHALIDONE (HYGROTEN) 25 MG TAB        CLOTRIMAZOLE-BETAMETHASONE (LOTRISONE) LOTION    Apply topically 2 (two) times daily.    GABAPENTIN (NEURONTIN) 100 MG CAPSULE    TAKE ONE CAPSULE BY MOUTH THREE TIMES DAILY AS NEEDED FOR FACIAL TINGLING    HYDRALAZINE (APRESOLINE) 50 MG TABLET        KETOCONAZOLE (NIZORAL) 2 % CREAM    APPLY TOPICALLY AA QHS    KETOCONAZOLE (NIZORAL) 2 % SHAMPOO    SHAMPOO ONCE DAILY AND LEAVE IT ON FOR 5 MINUTES AND RINSE. USE FOR 14 DAYS    LOSARTAN (COZAAR) 100 MG TABLET    Take 1 tablet (100 mg total) by mouth once daily.    METHOCARBAMOL (ROBAXIN) 500 MG TAB    TK 1 T PO TID WC FOR 3 TO 10 DAYS    NABUMETONE (RELAFEN) 750 MG TABLET        PANTOPRAZOLE (PROTONIX) 40 MG TABLET    Take 1 tablet (40 mg total) by mouth once daily.    ROSUVASTATIN (CRESTOR) 5 MG TABLET    Take 1 tablet (5 mg total) by mouth once daily.    SPIRONOLACTONE (ALDACTONE) 25 MG TABLET    Take 1 tablet (25 mg total) by mouth once daily.         Disclaimer:  This note has been generated using voice-recognition software. There may be grammatical or spelling errors that have been missed during proof-reading   This note was generated with the assistance of ambient listening technology. Verbal consent was obtained by the patient and accompanying visitor(s) for the recording of patient appointment to facilitate this note. I attest to having reviewed and edited the generated note for accuracy, though some syntax or spelling errors may persist. Please contact the author of this note for any clarification.

## 2025-02-11 NOTE — PATIENT INSTRUCTIONS
Weight control  For Diet   - Try the DASH   - DASH- https://www.nhlbi.nih.gov/health-topics/dash-eating-plan    - Try meeting with a Nutritionist  - https://www.SnowS[employer] St[employer].com/us/nutritionists-dietitians/la/new-orcarmina .     For Food delivery program try  - [employer] [place]  - https://[employer]/  - Purple carrot - https://www.[employer]/    Try a portion control plate  https://Visible Technologies.org/portion-control-plates-that-get-results/    For Exercise  Start with at least 20 min a day of moderate intensity exercise  This can be done at your gym or at home  Some home workout beginner plans include   - https://OneTok.[employer]/  - https://yogawithadriene.com      For applications  - Myplate.com - https://www.myplate.gov/   - myfitnesspal - https://www.my[employer] Health Mediapal.com/  - Noom - https://www.noom.com/  - Products and Programs  optavia

## 2025-02-21 DIAGNOSIS — E78.5 HYPERLIPIDEMIA, UNSPECIFIED HYPERLIPIDEMIA TYPE: ICD-10-CM

## 2025-02-21 RX ORDER — ROSUVASTATIN CALCIUM 5 MG/1
5 TABLET, COATED ORAL DAILY
Qty: 90 TABLET | Refills: 1 | Status: SHIPPED | OUTPATIENT
Start: 2025-02-21

## 2025-02-21 RX ORDER — PANTOPRAZOLE SODIUM 40 MG/1
40 TABLET, DELAYED RELEASE ORAL DAILY
Qty: 30 TABLET | Refills: 11 | OUTPATIENT
Start: 2025-02-21 | End: 2026-02-21

## 2025-02-21 NOTE — TELEPHONE ENCOUNTER
Care Due:                  Date            Visit Type   Department     Provider  --------------------------------------------------------------------------------                                EP -                              PRIMARY      [facility] INTERNAL  Last Visit: 02-      CARE ([facility])   MEDICINE       Guy Sena  Next Visit: None Scheduled  None         None Found                                                            Last  Test          Frequency    Reason                     Performed    Due Date  --------------------------------------------------------------------------------    CMP.........  12 months..  rosuvastatin.............  Not Found    Overdue    [employer] Embedded Care Due Messages. Reference number: [id number].   2/21/2025 12:38:55 AM CST

## 2025-03-20 RX ORDER — PANTOPRAZOLE SODIUM 40 MG/1
40 TABLET, DELAYED RELEASE ORAL DAILY
Qty: 30 TABLET | Refills: 11 | OUTPATIENT
Start: 2025-03-20 | End: 2026-03-20

## 2025-05-20 ENCOUNTER — OFFICE VISIT (OUTPATIENT)
Dept: OPTOMETRY | Facility: CLINIC | Age: 60
End: 2025-05-20
Payer: COMMERCIAL

## 2025-05-20 DIAGNOSIS — H40.023 OPEN ANGLE WITH BORDERLINE FINDINGS AND HIGH GLAUCOMA RISK IN BOTH EYES: Primary | ICD-10-CM

## 2025-05-20 DIAGNOSIS — H52.13 MYOPIA OF BOTH EYES WITH ASTIGMATISM AND PRESBYOPIA: ICD-10-CM

## 2025-05-20 DIAGNOSIS — H52.203 MYOPIA OF BOTH EYES WITH ASTIGMATISM AND PRESBYOPIA: ICD-10-CM

## 2025-05-20 DIAGNOSIS — H52.4 MYOPIA OF BOTH EYES WITH ASTIGMATISM AND PRESBYOPIA: ICD-10-CM

## 2025-05-20 PROCEDURE — 1160F RVW MEDS BY RX/DR IN RCRD: CPT | Mod: CPTII,S$GLB,, | Performed by: OPTOMETRIST

## 2025-05-20 PROCEDURE — 1159F MED LIST DOCD IN RCRD: CPT | Mod: CPTII,S$GLB,, | Performed by: OPTOMETRIST

## 2025-05-20 PROCEDURE — 99213 OFFICE O/P EST LOW 20 MIN: CPT | Mod: S$GLB,,, | Performed by: OPTOMETRIST

## 2025-05-20 PROCEDURE — 99999 PR PBB SHADOW E&M-EST. PATIENT-LVL II: CPT | Mod: PBBFAC,,, | Performed by: OPTOMETRIST

## 2025-05-20 NOTE — PROGRESS NOTES
HPI    CORA: 01/24  Chief complaint (CC): Patient is here for annual eye exam today.  Patient   hasn't noticed any vision changes since the last exam.  Glasses still seem   fine.  Glasses? +  Contacts? -  H/o eye surgery, injections or laser: -  H/o eye injury: -  Known eye conditions? See above  Family h/o eye conditions? Mother with glaucoma  Eye gtts? -      (-) Flashes (-)  Floaters (-) Mucous   (-)  Tearing (-) Itching (-) Burning   (-) Headaches (-) Eye Pain/discomfort (-) Irritation   (-)  Redness (-) Double vision (-) Blurry vision    Diabetic? -  A1c? -      Last edited by Angelina Caraballo on 5/20/2025 10:48 AM.            Assessment /Plan     For exam results, see Encounter Report.      Open angle with borderline findings and low glaucoma risk in both eyes  (+)FHx-mom. IOP 15 OD, OS. Last 14 OD, OS. C/d 0.75 OD, OS. Pachy 492 OD, 513 OS.   1/31/2023 OCT OD thin N, G and TI, borderline NS and NI, OS thin TS, G and TI 79 (prev 100), borderline NI   1/24/2024 OCT OD thin N, G, and TI, borderline NS and NI, OS thin TS, G and TI  1/31/2023 HVF OD borderline w/excessive high false positives, OS excessive high false positives w/nasal defects.   1/24/2024 HVF OD scattered defect, OS inf and sup nasal step  Stable testing  Educated pt on findings w/understanding.   RT OCT/24-2 Norma faster HVF. Will message with results.    Myopia of both eyes with astigmatism and presbyopia  SRx released to patient. Patient educated on lens options. Normal ocular health. RTC 1 year for routine exam.

## 2025-06-20 ENCOUNTER — PATIENT OUTREACH (OUTPATIENT)
Dept: ADMINISTRATIVE | Facility: HOSPITAL | Age: 60
End: 2025-06-20
Payer: COMMERCIAL

## 2025-06-21 DIAGNOSIS — I10 ESSENTIAL HYPERTENSION: ICD-10-CM

## 2025-06-22 RX ORDER — LOSARTAN POTASSIUM 100 MG/1
TABLET ORAL
Qty: 90 TABLET | Refills: 2 | Status: SHIPPED | OUTPATIENT
Start: 2025-06-22

## 2025-06-22 NOTE — TELEPHONE ENCOUNTER
Care Due:                  Date            Visit Type   Department     Provider  --------------------------------------------------------------------------------                                EP -                              PRIMARY      [facility] INTERNAL  Last Visit: 02-      CARE ([facility])   MEDICINE       Guy Sena  Next Visit: None Scheduled  None         None Found                                                            Last  Test          Frequency    Reason                     Performed    Due Date  --------------------------------------------------------------------------------    CMP.........  12 months..  rosuvastatin.............  Not Found    Overdue    [employer] Embedded Care Due Messages. Reference number: [id number].   6/21/2025 8:34:50 PM CDT

## 2025-06-22 NOTE — TELEPHONE ENCOUNTER
Provider Staff:  Action required for this patient    Requires labs -cmp     Please see care gap opportunities below in Care Due Message.    Thanks!  [facility]     Appointments      Date Provider   Last Visit   2/11/2025 Guy Sena III, MD   Next Visit   Visit date not found Guy Sena III, MD     Refill Decision Note   Kenan Villalobos  is requesting a refill authorization.  Brief Assessment and Rationale for Refill:  Approve     Medication Therapy Plan:         Comments:     Note composed:12:11 PM 06/22/2025

## 2025-06-24 DIAGNOSIS — I10 ESSENTIAL HYPERTENSION: ICD-10-CM

## 2025-06-24 RX ORDER — SPIRONOLACTONE 25 MG/1
25 TABLET ORAL DAILY
Qty: 90 TABLET | Refills: 2 | Status: SHIPPED | OUTPATIENT
Start: 2025-06-24

## 2025-06-24 NOTE — TELEPHONE ENCOUNTER
No care due was identified.  [facility] Embedded Care Due Messages. Reference number: [id number].   6/24/2025 11:19:41 AM CDT

## 2025-06-24 NOTE — TELEPHONE ENCOUNTER
Refill Decision Note   Kenan Villalobos  is requesting a refill authorization.  Brief Assessment and Rationale for Refill:  Approve     Medication Therapy Plan:        Comments:     Note composed:11:39 AM 06/24/2025

## 2025-07-24 ENCOUNTER — PATIENT MESSAGE (OUTPATIENT)
Dept: OPTOMETRY | Facility: CLINIC | Age: 60
End: 2025-07-24
Payer: COMMERCIAL

## 2025-07-24 ENCOUNTER — PATIENT MESSAGE (OUTPATIENT)
Dept: FAMILY MEDICINE | Facility: CLINIC | Age: 60
End: 2025-07-24
Payer: COMMERCIAL

## 2025-09-05 ENCOUNTER — PATIENT MESSAGE (OUTPATIENT)
Dept: INTERNAL MEDICINE | Facility: CLINIC | Age: 60
End: 2025-09-05
Payer: COMMERCIAL

## 2025-09-05 ENCOUNTER — TELEPHONE (OUTPATIENT)
Dept: PLASTIC SURGERY | Facility: CLINIC | Age: 60
End: 2025-09-05
Payer: COMMERCIAL